# Patient Record
Sex: FEMALE | Race: WHITE | URBAN - METROPOLITAN AREA
[De-identification: names, ages, dates, MRNs, and addresses within clinical notes are randomized per-mention and may not be internally consistent; named-entity substitution may affect disease eponyms.]

---

## 2021-02-15 ENCOUNTER — EMERGENCY (EMERGENCY)
Facility: HOSPITAL | Age: 66
LOS: 0 days | Discharge: HOME | End: 2021-02-16
Attending: EMERGENCY MEDICINE | Admitting: EMERGENCY MEDICINE
Payer: MEDICARE

## 2021-02-15 VITALS
SYSTOLIC BLOOD PRESSURE: 125 MMHG | DIASTOLIC BLOOD PRESSURE: 58 MMHG | TEMPERATURE: 98 F | WEIGHT: 149.91 LBS | RESPIRATION RATE: 16 BRPM | HEART RATE: 75 BPM | OXYGEN SATURATION: 98 %

## 2021-02-15 DIAGNOSIS — Z88.0 ALLERGY STATUS TO PENICILLIN: ICD-10-CM

## 2021-02-15 DIAGNOSIS — R10.9 UNSPECIFIED ABDOMINAL PAIN: ICD-10-CM

## 2021-02-15 DIAGNOSIS — N39.0 URINARY TRACT INFECTION, SITE NOT SPECIFIED: ICD-10-CM

## 2021-02-15 DIAGNOSIS — N20.0 CALCULUS OF KIDNEY: ICD-10-CM

## 2021-02-15 LAB
ALBUMIN SERPL ELPH-MCNC: 4.4 G/DL — SIGNIFICANT CHANGE UP (ref 3.5–5.2)
ALP SERPL-CCNC: 74 U/L — SIGNIFICANT CHANGE UP (ref 30–115)
ALT FLD-CCNC: 37 U/L — SIGNIFICANT CHANGE UP (ref 0–41)
ANION GAP SERPL CALC-SCNC: 9 MMOL/L — SIGNIFICANT CHANGE UP (ref 7–14)
APPEARANCE UR: ABNORMAL
AST SERPL-CCNC: 29 U/L — SIGNIFICANT CHANGE UP (ref 0–41)
BACTERIA # UR AUTO: NEGATIVE — SIGNIFICANT CHANGE UP
BASOPHILS # BLD AUTO: 0.05 K/UL — SIGNIFICANT CHANGE UP (ref 0–0.2)
BASOPHILS NFR BLD AUTO: 0.7 % — SIGNIFICANT CHANGE UP (ref 0–1)
BILIRUB DIRECT SERPL-MCNC: <0.2 MG/DL — SIGNIFICANT CHANGE UP (ref 0–0.2)
BILIRUB INDIRECT FLD-MCNC: >0.1 MG/DL — LOW (ref 0.2–1.2)
BILIRUB SERPL-MCNC: 0.3 MG/DL — SIGNIFICANT CHANGE UP (ref 0.2–1.2)
BILIRUB UR-MCNC: NEGATIVE — SIGNIFICANT CHANGE UP
BUN SERPL-MCNC: 12 MG/DL — SIGNIFICANT CHANGE UP (ref 10–20)
CALCIUM SERPL-MCNC: 10 MG/DL — SIGNIFICANT CHANGE UP (ref 8.5–10.1)
CHLORIDE SERPL-SCNC: 105 MMOL/L — SIGNIFICANT CHANGE UP (ref 98–110)
CO2 SERPL-SCNC: 27 MMOL/L — SIGNIFICANT CHANGE UP (ref 17–32)
COLOR SPEC: YELLOW — SIGNIFICANT CHANGE UP
CREAT SERPL-MCNC: 0.7 MG/DL — SIGNIFICANT CHANGE UP (ref 0.7–1.5)
DIFF PNL FLD: ABNORMAL
EOSINOPHIL # BLD AUTO: 0.12 K/UL — SIGNIFICANT CHANGE UP (ref 0–0.7)
EOSINOPHIL NFR BLD AUTO: 1.6 % — SIGNIFICANT CHANGE UP (ref 0–8)
EPI CELLS # UR: 4 /HPF — SIGNIFICANT CHANGE UP (ref 0–5)
GLUCOSE SERPL-MCNC: 136 MG/DL — HIGH (ref 70–99)
GLUCOSE UR QL: NEGATIVE — SIGNIFICANT CHANGE UP
HCT VFR BLD CALC: 35.8 % — LOW (ref 37–47)
HGB BLD-MCNC: 11.1 G/DL — LOW (ref 12–16)
HYALINE CASTS # UR AUTO: 5 /LPF — SIGNIFICANT CHANGE UP (ref 0–7)
IMM GRANULOCYTES NFR BLD AUTO: 0.4 % — HIGH (ref 0.1–0.3)
KETONES UR-MCNC: NEGATIVE — SIGNIFICANT CHANGE UP
LACTATE SERPL-SCNC: 1.8 MMOL/L — SIGNIFICANT CHANGE UP (ref 0.7–2)
LEUKOCYTE ESTERASE UR-ACNC: ABNORMAL
LIDOCAIN IGE QN: 29 U/L — SIGNIFICANT CHANGE UP (ref 7–60)
LYMPHOCYTES # BLD AUTO: 2.95 K/UL — SIGNIFICANT CHANGE UP (ref 1.2–3.4)
LYMPHOCYTES # BLD AUTO: 38.6 % — SIGNIFICANT CHANGE UP (ref 20.5–51.1)
MCHC RBC-ENTMCNC: 20.5 PG — LOW (ref 27–31)
MCHC RBC-ENTMCNC: 31 G/DL — LOW (ref 32–37)
MCV RBC AUTO: 66.1 FL — LOW (ref 81–99)
MONOCYTES # BLD AUTO: 0.59 K/UL — SIGNIFICANT CHANGE UP (ref 0.1–0.6)
MONOCYTES NFR BLD AUTO: 7.7 % — SIGNIFICANT CHANGE UP (ref 1.7–9.3)
NEUTROPHILS # BLD AUTO: 3.9 K/UL — SIGNIFICANT CHANGE UP (ref 1.4–6.5)
NEUTROPHILS NFR BLD AUTO: 51 % — SIGNIFICANT CHANGE UP (ref 42.2–75.2)
NITRITE UR-MCNC: NEGATIVE — SIGNIFICANT CHANGE UP
NRBC # BLD: 0 /100 WBCS — SIGNIFICANT CHANGE UP (ref 0–0)
PH UR: 6 — SIGNIFICANT CHANGE UP (ref 5–8)
PLATELET # BLD AUTO: 359 K/UL — SIGNIFICANT CHANGE UP (ref 130–400)
POTASSIUM SERPL-MCNC: 4.1 MMOL/L — SIGNIFICANT CHANGE UP (ref 3.5–5)
POTASSIUM SERPL-SCNC: 4.1 MMOL/L — SIGNIFICANT CHANGE UP (ref 3.5–5)
PROT SERPL-MCNC: 6.9 G/DL — SIGNIFICANT CHANGE UP (ref 6–8)
PROT UR-MCNC: ABNORMAL
RBC # BLD: 5.42 M/UL — HIGH (ref 4.2–5.4)
RBC # FLD: 15.9 % — HIGH (ref 11.5–14.5)
RBC CASTS # UR COMP ASSIST: 197 /HPF — HIGH (ref 0–4)
SODIUM SERPL-SCNC: 141 MMOL/L — SIGNIFICANT CHANGE UP (ref 135–146)
SP GR SPEC: 1.02 — SIGNIFICANT CHANGE UP (ref 1.01–1.03)
UROBILINOGEN FLD QL: SIGNIFICANT CHANGE UP
WBC # BLD: 7.64 K/UL — SIGNIFICANT CHANGE UP (ref 4.8–10.8)
WBC # FLD AUTO: 7.64 K/UL — SIGNIFICANT CHANGE UP (ref 4.8–10.8)
WBC UR QL: 18 /HPF — HIGH (ref 0–5)

## 2021-02-15 PROCEDURE — 74177 CT ABD & PELVIS W/CONTRAST: CPT | Mod: 26

## 2021-02-15 PROCEDURE — 99285 EMERGENCY DEPT VISIT HI MDM: CPT

## 2021-02-15 RX ORDER — ONDANSETRON 8 MG/1
4 TABLET, FILM COATED ORAL ONCE
Refills: 0 | Status: COMPLETED | OUTPATIENT
Start: 2021-02-15 | End: 2021-02-15

## 2021-02-15 RX ORDER — FAMOTIDINE 10 MG/ML
20 INJECTION INTRAVENOUS ONCE
Refills: 0 | Status: COMPLETED | OUTPATIENT
Start: 2021-02-15 | End: 2021-02-15

## 2021-02-15 RX ORDER — SODIUM CHLORIDE 9 MG/ML
1000 INJECTION, SOLUTION INTRAVENOUS ONCE
Refills: 0 | Status: COMPLETED | OUTPATIENT
Start: 2021-02-15 | End: 2021-02-15

## 2021-02-15 RX ORDER — MORPHINE SULFATE 50 MG/1
4 CAPSULE, EXTENDED RELEASE ORAL ONCE
Refills: 0 | Status: DISCONTINUED | OUTPATIENT
Start: 2021-02-15 | End: 2021-02-15

## 2021-02-15 RX ADMIN — ONDANSETRON 4 MILLIGRAM(S): 8 TABLET, FILM COATED ORAL at 21:07

## 2021-02-15 RX ADMIN — SODIUM CHLORIDE 1000 MILLILITER(S): 9 INJECTION, SOLUTION INTRAVENOUS at 21:07

## 2021-02-15 RX ADMIN — MORPHINE SULFATE 4 MILLIGRAM(S): 50 CAPSULE, EXTENDED RELEASE ORAL at 21:08

## 2021-02-15 RX ADMIN — FAMOTIDINE 20 MILLIGRAM(S): 10 INJECTION INTRAVENOUS at 23:16

## 2021-02-15 NOTE — ED PROVIDER NOTE - ATTENDING CONTRIBUTION TO CARE
66 yo female with pmh of kidney stones, last lithotripsy 3 yrs ago, and spinal fusion presents to the ER for Rt flank pain radiating to the RLQ about 1 hour PTA. Pt has associated N/V with this. Over past couple months treated with several abx for UTI's. No hematuria/dysuria/fever/chills/CP/SOB/HA. Exam as per PA note. Will check labs, urine and CT scan. To reassess.     ALL: pcn-->?  Meds denies  SH denies smoking or etoh  PMD Dr Herbert at [facility]

## 2021-02-15 NOTE — ED PROVIDER NOTE - PATIENT PORTAL LINK FT
You can access the FollowMyHealth Patient Portal offered by [facility] by registering at the following website: [email or web address]. By joining [employer]’s FollowMyHealth portal, you will also be able to view your health information using other applications (apps) compatible with our system.

## 2021-02-15 NOTE — ED PROVIDER NOTE - CARE PROVIDER_API CALL
Kp Wu)  Urology  [address] [place] [address]  Phone: [phone]  Fax: [phone]  Follow Up Time:

## 2021-02-15 NOTE — ED PROVIDER NOTE - NSFOLLOWUPINSTRUCTIONS_ED_ALL_ED_FT
Kidney Stones    Kidney stones (urolithiasis) are deposits that form inside your kidneys. The intense pain is caused by the stone moving through the urinary tract. When the stone moves, the ureter goes into spasm around the stone. The stone is usually passed in the urine. Symptoms include abdominal, side, or back pain, nausea, vomiting, blood in the urine, frequency with urination. Drink enough water and fluids to keep your urine clear or pale yellow. This will help you to pass the stone or stone fragments.    SEEK IMMEDIATE MEDICAL CARE IF YOU HAVE THE FOLLOWING SYMPTOMS: pain not controlled with medication, fever/chills, worsening vomiting, inability to urinate, or dizziness/lightheadedness.  Urinary Tract Infection, Adult  ImageA urinary tract infection (UTI) is an infection of any part of the urinary tract, which includes the kidneys, ureters, bladder, and urethra. These organs make, store, and get rid of urine in the body. UTI can be a bladder infection (cystitis) or kidney infection (pyelonephritis).    What are the causes?  This infection may be caused by fungi, viruses, or bacteria. Bacteria are the most common cause of UTIs. This condition can also be caused by repeated incomplete emptying of the bladder during urination.    What increases the risk?  This condition is more likely to develop if:    You ignore your need to urinate or hold urine for long periods of time.  You do not empty your bladder completely during urination.  You wipe back to front after urinating or having a bowel movement, if you are female.  You are uncircumcised, if you are male.  You are constipated.  You have a urinary catheter that stays in place (indwelling).  You have a weak defense (immune) system.  You have a medical condition that affects your bowels, kidneys, or bladder.  You have diabetes.  You take antibiotic medicines frequently or for long periods of time, and the antibiotics no longer work well against certain types of infections (antibiotic resistance).  You take medicines that irritate your urinary tract.  You are exposed to chemicals that irritate your urinary tract.  You are female.    What are the signs or symptoms?  Symptoms of this condition include:    Fever.  Frequent urination or passing small amounts of urine frequently.  Needing to urinate urgently.  Pain or burning with urination.  Urine that smells bad or unusual.  Cloudy urine.  Pain in the lower abdomen or back.  Trouble urinating.  Blood in the urine.  Vomiting or being less hungry than normal.  Diarrhea or abdominal pain.  Vaginal discharge, if you are female.    How is this diagnosed?  This condition is diagnosed with a medical history and physical exam. You will also need to provide a urine sample to test your urine. Other tests may be done, including:    Blood tests.  Sexually transmitted disease (STD) testing.    If you have had more than one UTI, a cystoscopy or imaging studies may be done to determine the cause of the infections.    How is this treated?  Treatment for this condition often includes a combination of two or more of the following:    Antibiotic medicine.  Other medicines to treat less common causes of UTI.  Over-the-counter medicines to treat pain.  Drinking enough water to stay hydrated.    Follow these instructions at home:  Take over-the-counter and prescription medicines only as told by your health care provider.  If you were prescribed an antibiotic, take it as told by your health care provider. Do not stop taking the antibiotic even if you start to feel better.  Avoid alcohol, caffeine, tea, and carbonated beverages. They can irritate your bladder.  Drink enough fluid to keep your urine clear or pale yellow.  Keep all follow-up visits as told by your health care provider. This is important.  ImageMake sure to:    Empty your bladder often and completely. Do not hold urine for long periods of time.  Empty your bladder before and after sex.  Wipe from front to back after a bowel movement if you are female. Use each tissue one time when you wipe.    Contact a health care provider if:  You have back pain.  You have a fever.  You feel nauseous or vomit.  Your symptoms do not get better after 3 days.  Your symptoms go away and then return.  Get help right away if:  You have severe back pain or lower abdominal pain.  You are vomiting and cannot keep down any medicines or water.  This information is not intended to replace advice given to you by your health care provider. Make sure you discuss any questions you have with your health care provider.

## 2021-02-15 NOTE — ED PROVIDER NOTE - CLINICAL SUMMARY MEDICAL DECISION MAKING FREE TEXT BOX
64 yo female with pmh of kidney stones, last lithotripsy 3 yrs ago, and spinal fusion presents to the ER for Rt flank pain radiating to the RLQ about 1 hour PTA. Pt has associated N/V with this. Over past couple months treated with several abx for UTI's. No hematuria/dysuria/fever/chills/CP/SOB/HA. Labs/UA/CT reviewed. +leuko/blood in  urine, and CT shows proximal right stone 7x5x8 with mod hydro. Urology consulted and pt to follow up with Dr Wu in the office this week. Send home with abx, pain meds, flomax. Return precautions given.

## 2021-02-15 NOTE — ED ADULT NURSE NOTE - OBJECTIVE STATEMENT
patient c/o 8/10 left flank pain starting 2 hours pta. states she has a hx of kidney stones and this is similar pain she has experienced before when she had a stone. took 600 mg ibuprofen with no relief. a/w nausea and 2 episodes of vomiting. denies cp/sob.

## 2021-02-15 NOTE — ED PROVIDER NOTE - NS ED ROS FT
Review of Systems:  	•	CONSTITUTIONAL: no fever, no diaphoresis, no chills  	•	SKIN: no rash  	•	HEMATOLOGIC: no bleeding, no bruising  	•	EYES: no eye pain, no blurry vision  	•	ENT: no change in hearing, no sore throat, no ear pain or tinnitus  	•	RESPIRATORY: no shortness of breath, no cough  	•	CARDIAC: no chest pain, no palpitations  	•	GI: +R flank pain, +RLQ pain.  	•	GENITO-URINARY: no discharge, no dysuria; no hematuria, no increased urinary frequency  	•	MUSCULOSKELETAL: no joint paint, no swelling, no redness  	•	NEUROLOGIC: no weakness, no headache, no paresthesias

## 2021-02-15 NOTE — ED PROVIDER NOTE - PROGRESS NOTE DETAILS
Urology consult called, given proximal large right sided kidney stone with associated mod hydroneph as per urology, can dc home to f/u with basilotte with flomax, toradol and cipro for +ua. strict return prec rev'd with pt and all ?s ans. potential s/e of meds explained : cdfiff/qtp/tendonitis/tendon rupture.  advised to d/c use if any arrythmia/chest pain/palpitations and return to ER   if any other intolerable s/e dc use and rto for further assesment. Recommneded taking probiotics as per urology, can dc home to f/u with basilotte in office, dc with flomax, toradol and cipro for +ua. strict return prec rev'd with pt and all ?s ans. potential s/e of meds explained : cdfiff/qtp/tendonitis/tendon rupture.  advised to d/c use if any arrythmia/chest pain/palpitations and return to ER   if any other intolerable s/e dc use and rto for further assesment. Recommneded taking probiotics

## 2021-02-15 NOTE — ED PROVIDER NOTE - PHYSICAL EXAMINATION
CONSTITUTIONAL: Well-developed; well-nourished; in no acute distress, nontoxic appearing  SKIN: skin exam is warm and dry,  HEAD: Normocephalic; atraumatic.  ENT: MMM  CARD: S1, S2 normal, no murmur  RESP: No wheezes, rales or rhonchi. Good air movement bilaterally  ABD: +suprapubic TTP, no rebound/guarding. no cvat. no overlying skin changes.   EXT: Normal ROM.   NEURO: awake, alert, following commands, oriented, grossly unremarkable. No Focal deficits. GCS 15.   PSYCH: Cooperative, appropriate.

## 2021-02-15 NOTE — ED ADULT TRIAGE NOTE - CHIEF COMPLAINT QUOTE
pt c.o. right sided abdominal pain that radiates to her back associated with two episodes of emesis  pt reports hx of kidney stones

## 2021-02-15 NOTE — ED ADULT TRIAGE NOTE - NS ED TRIAGE AVPU SCALE
DISPLAY PLAN FREE TEXT Alert-The patient is alert, awake and responds to voice. The patient is oriented to time, place, and person. The triage nurse is able to obtain subjective information.

## 2021-02-15 NOTE — ED PROVIDER NOTE - OBJECTIVE STATEMENT
65 year old female, past medical history kidney stones, who presents with right flank pain radiating to RLQ that began 1 hour PTA. Patient endorses intermittent pain since with associated nausea and 2 episodes of nb/nb vomiting. No f/c, CP, SOB, urinary symptoms. Patient with hx kidney stones requiring lithotripsy. Patient also endorses +UTI x2 months ago, was on cipro with improvement of symptoms. patient follows with [facility], has not seen urologist/had kidney stone x3 years. No hx abdominal surgeries.

## 2021-02-16 VITALS — DIASTOLIC BLOOD PRESSURE: 60 MMHG | HEART RATE: 73 BPM | SYSTOLIC BLOOD PRESSURE: 121 MMHG

## 2021-02-16 LAB
CULTURE RESULTS: SIGNIFICANT CHANGE UP
SPECIMEN SOURCE: SIGNIFICANT CHANGE UP

## 2021-02-16 RX ORDER — KETOROLAC TROMETHAMINE 30 MG/ML
1 SYRINGE (ML) INJECTION
Qty: 15 | Refills: 0
Start: 2021-02-16 | End: 2021-02-20

## 2021-02-16 RX ORDER — CIPROFLOXACIN LACTATE 400MG/40ML
400 VIAL (ML) INTRAVENOUS ONCE
Refills: 0 | Status: COMPLETED | OUTPATIENT
Start: 2021-02-16 | End: 2021-02-16

## 2021-02-16 RX ORDER — MOXIFLOXACIN HYDROCHLORIDE TABLETS, 400 MG 400 MG/1
1 TABLET, FILM COATED ORAL
Qty: 14 | Refills: 0
Start: 2021-02-16 | End: 2021-02-22

## 2021-02-16 RX ORDER — TAMSULOSIN HYDROCHLORIDE 0.4 MG/1
1 CAPSULE ORAL
Qty: 7 | Refills: 0
Start: 2021-02-16 | End: 2021-02-22

## 2021-02-16 RX ADMIN — Medication 200 MILLIGRAM(S): at 00:17

## 2021-02-16 NOTE — CONSULT NOTE ADULT - ASSESSMENT
65 year old female with past medical history kidney stones, who presents with intermittent right flank pain radiating to RLQ x1 hour. CTA/P showed moderate hydronephrosis 2/2 a 7x5x8 right proximal ureteral calculus - no left hydronephrosis noted.     ·	Case discusses w/ Dr. Wu, plan is as follows  ·	Patient to f/u outpatient w/ Dr. Wu for ESWL to treat kidney stone  ·	Pain control  ·	Strain all urine  ·	Encourage po fluids  ·	Recommend flomax 0.4 qd  ·	Return to ED if patient becomes febrile, develops intractable pain or intractable vomiting.

## 2021-02-16 NOTE — CONSULT NOTE ADULT - SUBJECTIVE AND OBJECTIVE BOX
Urology Consult Note: 65 year old female with past medical history kidney stones, who presents with intermittent right flank pain radiating to RLQ x1 hour. Patient states that she had experienced 2 episodes of NBNB vomiting s/s the pain. Patient received 4mg of morphine for her pain in the ED - notes that her pain is gone since. Pt had ESWL done 3 years prior by Dr. Lantigua but has not followed up with him since. Patient usually followed with [facility] for her general care. CTA/P showed moderate hydronephrosis 2/2 a 7x5x8 right proximal ureteral calculus - no left hydronephrosis noted. Patient denies fever, chills, CP, SOB, or any urinary symptoms.     [ x ] A 10 Point Review of Systems was negative except where noted    Allergies: penicillin (Unknown)    SOCIAL HISTORY: No illicit drug use    Vital Signs Last 24 Hrs  T(C): 36.9 (15 Feb 2021 20:35), Max: 36.9 (15 Feb 2021 20:35)  T(F): 98.4 (15 Feb 2021 20:35), Max: 98.4 (15 Feb 2021 20:35)  HR: 73 (2021 01:43) (73 - 75)  BP: 121/60 (2021 01:43) (121/60 - 125/58)  RR: 16 (15 Feb 2021 20:35) (16 - 16)  SpO2: 98% (15 Feb 2021 20:35) (98% - 98%)    PHYSICAL EXAM:  Constitutional: NAD, well-developed, well nourished, cooperative  HEENT: NC/AT  Back: No CVA tenderness bilaterally  Respiratory: No accessory respiratory muscle use  Abd: Soft, NT/ND, no suprapubic tenderness to palpation  Cardio: +S1/S2  : No suprapubic tenderness to palpation  Extremities: No edema or cyanosis, ORDAZ x 4  Neurological: Awake and alert  Psychiatric: Normal mood, normal affect  Skin: No rashes    LABS:                        11.1   7.64  )-----------( 359      ( 15 Feb 2021 21:17 )             35.8     02-15    141  |  105  |  12  ----------------------------<  136<H>  4.1   |  27  |  0.7    Ca    10.0      15 Feb 2021 21:17    TPro  6.9  /  Alb  4.4  /  TBili  0.3  /  DBili  <0.2  /  AST  29  /  ALT  37  /  AlkPhos  74  02-15      Urinalysis Basic - ( 15 Feb 2021 21:59 )    Color: Yellow / Appearance: Slightly Turbid / S.023 / pH: x  Gluc: x / Ketone: Negative  / Bili: Negative / Urobili: <2 mg/dL   Blood: x / Protein: 30 mg/dL / Nitrite: Negative   Leuk Esterase: Moderate / RBC: 197 /HPF / WBC 18 /HPF   Sq Epi: x / Non Sq Epi: 4 /HPF / Bacteria: Negative    RADIOLOGY & ADDITIONAL STUDIES:  < from: CT Abdomen and Pelvis w/ IV Cont (02.15.21 @ 23:23) >    EXAM:  CT ABDOMEN AND PELVIS IC            PROCEDURE DATE:  02/15/2021      INTERPRETATION:  CLINICAL STATEMENT: Right flank pain    TECHNIQUE: Contiguous CT images were obtained of the abdomen and pelvis.  Intravenous Contrast: 100 cc Omnipaque 350 intravenous contrast was administered.  Oral contrast: was not administered.      COMPARISON:  None    FINDINGS:    LOWER CHEST: There is mild bibasilar subsegmental atelectasis.    LIVER: Diffuse hepatic steatosis and borderline hepatomegaly.    SPLEEN: Unremarkable.    PANCREAS: 1.1 cm cyst/hypodensity along the pancreatic head (series 5, image 198). No evidence for main duct dilatation.    GALLBLADDER AND BILIARY TREE: There is cholelithiasis. No CT evidence for pericholecystic inflammation. No biliary ductal dilatation.    ADRENALS: Unremarkable.    KIDNEYS: Moderate right hydronephrosis secondary to a 7 x 5 x 8 mm proximal right ureteral calculus (1200 Hounsfield units). No left-sided hydronephrosis    LYMPH NODES: There are no enlarged abdominal or pelvic lymph nodes.    VASCULATURE: The abdominal aorta is normal in caliber.    BOWEL: No bowel obstruction or bowel wall thickening. Unremarkable appendix. Colonic diverticulosis without evidence of diverticulitis.    PERITONEUM/RETROPERITONEUM/MESENTERY: There is no ascites or pneumoperitoneum.    PELVIC VISCERA: Unremarkable. Underdistention limits evaluation of the urinary bladder.    BONES AND SOFT TISSUES: Anterolisthesis of L3 on L4 and L4 on L5. Posterior spinal fusionof L3-L5 with L4-L5 disc spacer.      IMPRESSION:    Moderate right hydronephrosis secondary to a 7 x 5 x 8 mm proximal right ureteral calculus .    Hepatic steatosis. Cholelithiasis.    1.1 cm hypodensity/cyst at the pancreatic head. Nonemergent MRI/MRCP can be utilized for further evaluation.      ABRAHAM MATTA MD; Attending Radiologist  This document has been electronically signed. Feb 15 2021 11:52PM    < end of copied text >

## 2021-05-12 ENCOUNTER — APPOINTMENT (OUTPATIENT)
Dept: OTOLARYNGOLOGY | Facility: CLINIC | Age: 66
End: 2021-05-12
Payer: MEDICARE

## 2021-05-12 VITALS — BODY MASS INDEX: 27.6 KG/M2 | WEIGHT: 150 LBS | TEMPERATURE: 97.8 F | HEIGHT: 62 IN

## 2021-05-12 DIAGNOSIS — Z78.9 OTHER SPECIFIED HEALTH STATUS: ICD-10-CM

## 2021-05-12 PROBLEM — Z00.00 ENCOUNTER FOR PREVENTIVE HEALTH EXAMINATION: Status: ACTIVE | Noted: 2021-05-12

## 2021-05-12 PROCEDURE — 92570 ACOUSTIC IMMITANCE TESTING: CPT

## 2021-05-12 PROCEDURE — 99072 ADDL SUPL MATRL&STAF TM PHE: CPT

## 2021-05-12 PROCEDURE — 92557 COMPREHENSIVE HEARING TEST: CPT

## 2021-05-12 PROCEDURE — 99203 OFFICE O/P NEW LOW 30 MIN: CPT | Mod: 25

## 2021-05-12 NOTE — CONSULT LETTER
[Dear  ___] : Dear  [unfilled], [Consult Letter:] : I had the pleasure of evaluating your patient, [unfilled]. [Please see my note below.] : Please see my note below. [Consult Closing:] : Thank you very much for allowing me to participate in the care of this patient.  If you have any questions, please do not hesitate to contact me. [Sincerely,] : Sincerely, [FreeTextEntry2] : Wanda Herbert MD [FreeTextEntry3] : Neena Mclean MD\par Otolaryngology - Head & Neck Surgery\par

## 2021-05-12 NOTE — DATA REVIEWED
[de-identified] : relevant images and reports personally reviewed by me:\par 5/12/21: hearing WNL AU, type A tymps AU

## 2021-05-12 NOTE — ASSESSMENT
[FreeTextEntry1] : - cerumen removed from right ear\par - reviewed audiogram with patient, hearing essentially WNL\par - f/up 6 months for ear cleaning

## 2021-05-12 NOTE — PHYSICAL EXAM
[Midline] : trachea located in midline position [Normal] : no rashes [de-identified] : bilateral cerumen impaction, removed with suction

## 2021-11-18 ENCOUNTER — APPOINTMENT (OUTPATIENT)
Dept: OTOLARYNGOLOGY | Facility: CLINIC | Age: 66
End: 2021-11-18

## 2021-12-20 ENCOUNTER — APPOINTMENT (OUTPATIENT)
Dept: OTOLARYNGOLOGY | Facility: CLINIC | Age: 66
End: 2021-12-20

## 2022-03-18 ENCOUNTER — APPOINTMENT (OUTPATIENT)
Dept: OTOLARYNGOLOGY | Facility: CLINIC | Age: 67
End: 2022-03-18
Payer: MEDICARE

## 2022-03-18 DIAGNOSIS — H61.21 IMPACTED CERUMEN, RIGHT EAR: ICD-10-CM

## 2022-03-18 DIAGNOSIS — H93.8X9 OTHER SPECIFIED DISORDERS OF EAR, UNSPECIFIED EAR: ICD-10-CM

## 2022-03-18 PROCEDURE — 69210 REMOVE IMPACTED EAR WAX UNI: CPT

## 2022-03-18 PROCEDURE — 99212 OFFICE O/P EST SF 10 MIN: CPT | Mod: 25

## 2022-03-18 NOTE — PHYSICAL EXAM
[Normal] : mucosa is normal [Midline] : trachea located in midline position [de-identified] : bilateral cerumen impaction, removed with curette

## 2022-03-18 NOTE — HISTORY OF PRESENT ILLNESS
[FreeTextEntry1] : Patient following up on clogged ears. Patient c/o clogged right ear. No otalgia. No tinnitus.

## 2022-04-03 ENCOUNTER — EMERGENCY (EMERGENCY)
Facility: HOSPITAL | Age: 67
LOS: 0 days | Discharge: HOME | End: 2022-04-04
Attending: EMERGENCY MEDICINE | Admitting: EMERGENCY MEDICINE
Payer: MEDICARE

## 2022-04-03 VITALS
OXYGEN SATURATION: 99 % | RESPIRATION RATE: 17 BRPM | HEART RATE: 81 BPM | WEIGHT: 145.06 LBS | DIASTOLIC BLOOD PRESSURE: 72 MMHG | SYSTOLIC BLOOD PRESSURE: 124 MMHG | TEMPERATURE: 98 F

## 2022-04-03 DIAGNOSIS — R10.13 EPIGASTRIC PAIN: ICD-10-CM

## 2022-04-03 DIAGNOSIS — R07.89 OTHER CHEST PAIN: ICD-10-CM

## 2022-04-03 DIAGNOSIS — Z87.891 PERSONAL HISTORY OF NICOTINE DEPENDENCE: ICD-10-CM

## 2022-04-03 DIAGNOSIS — Z20.822 CONTACT WITH AND (SUSPECTED) EXPOSURE TO COVID-19: ICD-10-CM

## 2022-04-03 DIAGNOSIS — Z86.73 PERSONAL HISTORY OF TRANSIENT ISCHEMIC ATTACK (TIA), AND CEREBRAL INFARCTION WITHOUT RESIDUAL DEFICITS: ICD-10-CM

## 2022-04-03 DIAGNOSIS — Z88.0 ALLERGY STATUS TO PENICILLIN: ICD-10-CM

## 2022-04-03 DIAGNOSIS — E78.5 HYPERLIPIDEMIA, UNSPECIFIED: ICD-10-CM

## 2022-04-03 DIAGNOSIS — Z87.442 PERSONAL HISTORY OF URINARY CALCULI: ICD-10-CM

## 2022-04-03 LAB
ALBUMIN SERPL ELPH-MCNC: 4.5 G/DL — SIGNIFICANT CHANGE UP (ref 3.5–5.2)
ALP SERPL-CCNC: 63 U/L — SIGNIFICANT CHANGE UP (ref 30–115)
ALT FLD-CCNC: 21 U/L — SIGNIFICANT CHANGE UP (ref 0–41)
ANION GAP SERPL CALC-SCNC: 11 MMOL/L — SIGNIFICANT CHANGE UP (ref 7–14)
AST SERPL-CCNC: 21 U/L — SIGNIFICANT CHANGE UP (ref 0–41)
BASOPHILS # BLD AUTO: 0.03 K/UL — SIGNIFICANT CHANGE UP (ref 0–0.2)
BASOPHILS NFR BLD AUTO: 0.4 % — SIGNIFICANT CHANGE UP (ref 0–1)
BILIRUB DIRECT SERPL-MCNC: <0.2 MG/DL — SIGNIFICANT CHANGE UP (ref 0–0.3)
BILIRUB INDIRECT FLD-MCNC: >0.3 MG/DL — SIGNIFICANT CHANGE UP (ref 0.2–1.2)
BILIRUB SERPL-MCNC: 0.5 MG/DL — SIGNIFICANT CHANGE UP (ref 0.2–1.2)
BUN SERPL-MCNC: 11 MG/DL — SIGNIFICANT CHANGE UP (ref 10–20)
CALCIUM SERPL-MCNC: 11.6 MG/DL — HIGH (ref 8.5–10.1)
CHLORIDE SERPL-SCNC: 102 MMOL/L — SIGNIFICANT CHANGE UP (ref 98–110)
CO2 SERPL-SCNC: 26 MMOL/L — SIGNIFICANT CHANGE UP (ref 17–32)
CREAT SERPL-MCNC: 0.7 MG/DL — SIGNIFICANT CHANGE UP (ref 0.7–1.5)
EGFR: 95 ML/MIN/1.73M2 — SIGNIFICANT CHANGE UP
EOSINOPHIL # BLD AUTO: 0.05 K/UL — SIGNIFICANT CHANGE UP (ref 0–0.7)
EOSINOPHIL NFR BLD AUTO: 0.7 % — SIGNIFICANT CHANGE UP (ref 0–8)
GLUCOSE SERPL-MCNC: 146 MG/DL — HIGH (ref 70–99)
HCT VFR BLD CALC: 33.8 % — LOW (ref 37–47)
HGB BLD-MCNC: 10.5 G/DL — LOW (ref 12–16)
IMM GRANULOCYTES NFR BLD AUTO: 0.3 % — SIGNIFICANT CHANGE UP (ref 0.1–0.3)
LIDOCAIN IGE QN: 54 U/L — SIGNIFICANT CHANGE UP (ref 7–60)
LYMPHOCYTES # BLD AUTO: 2.39 K/UL — SIGNIFICANT CHANGE UP (ref 1.2–3.4)
LYMPHOCYTES # BLD AUTO: 33.2 % — SIGNIFICANT CHANGE UP (ref 20.5–51.1)
MCHC RBC-ENTMCNC: 20.9 PG — LOW (ref 27–31)
MCHC RBC-ENTMCNC: 31.1 G/DL — LOW (ref 32–37)
MCV RBC AUTO: 67.3 FL — LOW (ref 81–99)
MONOCYTES # BLD AUTO: 0.48 K/UL — SIGNIFICANT CHANGE UP (ref 0.1–0.6)
MONOCYTES NFR BLD AUTO: 6.7 % — SIGNIFICANT CHANGE UP (ref 1.7–9.3)
NEUTROPHILS # BLD AUTO: 4.23 K/UL — SIGNIFICANT CHANGE UP (ref 1.4–6.5)
NEUTROPHILS NFR BLD AUTO: 58.7 % — SIGNIFICANT CHANGE UP (ref 42.2–75.2)
NRBC # BLD: 0 /100 WBCS — SIGNIFICANT CHANGE UP (ref 0–0)
PLATELET # BLD AUTO: 326 K/UL — SIGNIFICANT CHANGE UP (ref 130–400)
POTASSIUM SERPL-MCNC: 4 MMOL/L — SIGNIFICANT CHANGE UP (ref 3.5–5)
POTASSIUM SERPL-SCNC: 4 MMOL/L — SIGNIFICANT CHANGE UP (ref 3.5–5)
PROT SERPL-MCNC: 6.6 G/DL — SIGNIFICANT CHANGE UP (ref 6–8)
RBC # BLD: 5.02 M/UL — SIGNIFICANT CHANGE UP (ref 4.2–5.4)
RBC # FLD: 15.7 % — HIGH (ref 11.5–14.5)
SODIUM SERPL-SCNC: 139 MMOL/L — SIGNIFICANT CHANGE UP (ref 135–146)
TROPONIN T SERPL-MCNC: <0.01 NG/ML — SIGNIFICANT CHANGE UP
WBC # BLD: 7.2 K/UL — SIGNIFICANT CHANGE UP (ref 4.8–10.8)
WBC # FLD AUTO: 7.2 K/UL — SIGNIFICANT CHANGE UP (ref 4.8–10.8)

## 2022-04-03 PROCEDURE — 99218: CPT

## 2022-04-03 PROCEDURE — 71045 X-RAY EXAM CHEST 1 VIEW: CPT | Mod: 26

## 2022-04-03 PROCEDURE — 93010 ELECTROCARDIOGRAM REPORT: CPT | Mod: 76

## 2022-04-03 RX ORDER — ASPIRIN/CALCIUM CARB/MAGNESIUM 324 MG
325 TABLET ORAL ONCE
Refills: 0 | Status: COMPLETED | OUTPATIENT
Start: 2022-04-03 | End: 2022-04-03

## 2022-04-03 RX ORDER — ONDANSETRON 8 MG/1
4 TABLET, FILM COATED ORAL ONCE
Refills: 0 | Status: COMPLETED | OUTPATIENT
Start: 2022-04-03 | End: 2022-04-03

## 2022-04-03 RX ORDER — FAMOTIDINE 10 MG/ML
20 INJECTION INTRAVENOUS ONCE
Refills: 0 | Status: COMPLETED | OUTPATIENT
Start: 2022-04-03 | End: 2022-04-03

## 2022-04-03 RX ADMIN — Medication 325 MILLIGRAM(S): at 23:49

## 2022-04-03 RX ADMIN — Medication 30 MILLILITER(S): at 21:34

## 2022-04-03 RX ADMIN — ONDANSETRON 4 MILLIGRAM(S): 8 TABLET, FILM COATED ORAL at 21:01

## 2022-04-03 RX ADMIN — FAMOTIDINE 104 MILLIGRAM(S): 10 INJECTION INTRAVENOUS at 21:01

## 2022-04-03 NOTE — ED PROVIDER NOTE - OBJECTIVE STATEMENT
66 y.o. F, pmh HLD, TIA, kidney stones presenting for episodes of epigastric/chest discomfort since 6pm yesterday. 1 episode burning in nature radiating upwards after eating chocolate. Resolved with tums. Reoccurred this morning after drinking coffee. Did not resolve with multiple tums. Discomfort became sharp 1 hour pta to ED. Denies fever chills n/v, palpitations sob ,[place], calf swelling. Previous smoker ( cessation in 20s).     heart score= 3 ( 2+ age over 65, +1 pmh HLD). Low Risk for Major cardiac events

## 2022-04-03 NOTE — ED PROVIDER NOTE - NS ED ATTENDING STATEMENT MOD
This was a shared visit with the KELI. I reviewed and verified the documentation and independently performed the documented:

## 2022-04-03 NOTE — ED PROVIDER NOTE - CLINICAL SUMMARY MEDICAL DECISION MAKING FREE TEXT BOX
66-year-old female presents to the ED for epigastric and chest discomfort.  Occurred after ingestion of coffee.  Patient has multiple and similar episodes prior.  Physical exam unremarkable.  Vitals reviewed by me noted to be wnl.  EKG noted to have normal sinus rhythm with no STEMI.  We obtained labs, chest x-ray.  Labs reviewed by me, values noted to be within normal limits.  Troponin negative.  ED Chest X-Ray reviewed by me which did not reveal a ptx, infiltrate, or effusion.  Placed in observation for nuclear stress.

## 2022-04-03 NOTE — ED PROVIDER NOTE - PHYSICAL EXAMINATION
Physical Exam    Vital Signs: I have reviewed the initial vital signs.  Constitutional: well-nourished, appears stated age, no acute distress  Eyes: Conjunctiva pink, Sclera clear,  Cardiovascular: S1 and S2, regular rate, regular rhythm, well-perfused extremities, radial pulses equal and 2+, calves nonttp, equal in size  Respiratory: unlabored respiratory effort, speaking in full sentences, handling oral secretions, clear to auscultation bilaterally no wheezing, rales and rhonchi  Gastrointestinal: soft, non-tender abdomen, no pulsatile mass, normal bowl sounds  Musculoskeletal: supple neck, no lower extremity edema, appropriately, no gross bony deformities or swelling.  Integumentary: warm, dry, no rashes, lacerations,  Neurologic: awake, alert

## 2022-04-04 VITALS
TEMPERATURE: 98 F | OXYGEN SATURATION: 99 % | RESPIRATION RATE: 18 BRPM | DIASTOLIC BLOOD PRESSURE: 81 MMHG | SYSTOLIC BLOOD PRESSURE: 141 MMHG | HEART RATE: 78 BPM

## 2022-04-04 LAB
SARS-COV-2 RNA SPEC QL NAA+PROBE: SIGNIFICANT CHANGE UP
TROPONIN T SERPL-MCNC: <0.01 NG/ML — SIGNIFICANT CHANGE UP

## 2022-04-04 PROCEDURE — 93010 ELECTROCARDIOGRAM REPORT: CPT

## 2022-04-04 PROCEDURE — 78452 HT MUSCLE IMAGE SPECT MULT: CPT | Mod: 26,MA

## 2022-04-04 PROCEDURE — 93018 CV STRESS TEST I&R ONLY: CPT

## 2022-04-04 PROCEDURE — 93016 CV STRESS TEST SUPVJ ONLY: CPT

## 2022-04-04 PROCEDURE — 99217: CPT

## 2022-04-04 RX ORDER — REGADENOSON 0.08 MG/ML
0.4 INJECTION, SOLUTION INTRAVENOUS ONCE
Refills: 0 | Status: COMPLETED | OUTPATIENT
Start: 2022-04-04 | End: 2022-04-04

## 2022-04-04 RX ORDER — KETOROLAC TROMETHAMINE 30 MG/ML
15 SYRINGE (ML) INJECTION ONCE
Refills: 0 | Status: DISCONTINUED | OUTPATIENT
Start: 2022-04-04 | End: 2022-04-04

## 2022-04-04 RX ORDER — DIPHENHYDRAMINE HCL 50 MG
25 CAPSULE ORAL ONCE
Refills: 0 | Status: DISCONTINUED | OUTPATIENT
Start: 2022-04-04 | End: 2022-04-04

## 2022-04-04 RX ORDER — ONDANSETRON 8 MG/1
4 TABLET, FILM COATED ORAL ONCE
Refills: 0 | Status: COMPLETED | OUTPATIENT
Start: 2022-04-04 | End: 2022-04-04

## 2022-04-04 RX ADMIN — ONDANSETRON 4 MILLIGRAM(S): 8 TABLET, FILM COATED ORAL at 01:42

## 2022-04-04 RX ADMIN — Medication 15 MILLIGRAM(S): at 03:24

## 2022-04-04 RX ADMIN — Medication 15 MILLIGRAM(S): at 00:40

## 2022-04-04 RX ADMIN — REGADENOSON 0.4 MILLIGRAM(S): 0.08 INJECTION, SOLUTION INTRAVENOUS at 11:00

## 2022-04-04 RX ADMIN — Medication 15 MILLIGRAM(S): at 08:41

## 2022-04-04 NOTE — ED CDU PROVIDER DISPOSITION NOTE - PATIENT PORTAL LINK FT
You can access the FollowMyHealth Patient Portal offered by [facility] by registering at the following website: [email or web address]. By joining [employer]’s FollowMyHealth portal, you will also be able to view your health information using other applications (apps) compatible with our system.

## 2022-04-04 NOTE — ED CDU PROVIDER INITIAL DAY NOTE - NS ED ROS FT
Constitutional: (-) fever (-) chills  (-) lightheadedness   Eyes/ENT: (-) blurry vision, (-) epistaxis (-) rhinorrhea (-) nasal congestion  Cardiovascular: (+) chest pain, (-) syncope (-) palpitations   Respiratory: (-) cough, (-) shortness of breath (-) pleurisy   Gastrointestinal: (-) vomiting, (-) diarrhea (-) abdominal pain (-) nausea (-) anorexia  Musculoskeletal: (-) neck pain, (-) back pain, (-) joint pain (-) joint swelling (-) painful ROM  Integumentary: (-) rash, (-) edema (-) lacerations (-) pruritis   Neurological: (-) headache, (-) altered mental status (-) LOC (-) dizziness (-) paresthesias (-) gait abnormalities   Psychiatric: (-) hallucinations (-) SI (-) HI  Allergic/Immunologic: (-) pruritus

## 2022-04-04 NOTE — ED ADULT NURSE REASSESSMENT NOTE - NS ED NURSE REASSESS COMMENT FT1
pt seen and assessed. pt is a/o x 4. vss. pt still having midsternal cp. pt on continuos cardiac monitoring. NSR noted.

## 2022-04-04 NOTE — ED CDU PROVIDER INITIAL DAY NOTE - PROGRESS NOTE DETAILS
received signout from Michael Bansal - pt with hx htn, Hld, tia c/o moderate chest pain x 1 day burning; pt denies prior cardiac evaluation; will plan for stress test this am; Received sign out from MACO Medina. Excercise pharm nuc changed to Pharmacologic pharm nuc as patient expressed that she may not be able to perform on a treadmill. Spoke with stress-lab NP to make aware of new order. Patient was endorsed to me this morning, appears very well, denies any acute complaints.  Scheduled for formal nuke this morning. She appears very well, her testing is negative, stress test done and negative as well.  Pain is likely GI in etiology given history of recent diet, Chinese food and a large amount of coffee ingestion.  Patient states she has similar symptoms when she eats certain foods.  She was advised to follow-up with a gastroenterologist, contact information to them was provided, she was advised to call for an appointment.  Strict return precautions given. Patient to be discharged from ED. Any available test results were discussed with patient and/or family. Verbal instructions given, including instructions to return to ED immediately for any new, worsening, or concerning symptoms. Patient endorsed understanding. Written discharge instructions additionally given, including follow-up plan.  Patient was given opportunity to ask questions.

## 2022-04-04 NOTE — ED CDU PROVIDER INITIAL DAY NOTE - MEDICAL DECISION MAKING DETAILS
Patient with epigastric/chest pain, work-up including serial cardiac enzymes, EKG, chest x-ray and a stress test.  Testing was negative, patient reported improvement in pain with GI meds, stable for discharge home, advised to follow-up with GI and cardiology, strict return precautions given.

## 2022-04-04 NOTE — ED CDU PROVIDER DISPOSITION NOTE - CARE PROVIDER_API CALL
NILDA AMADOR  Internal Medicine  [address] [place] [address]  Phone: [phone]  Fax: [phone]  Follow Up Time: 1-3 Days    Mitul Light)  Gastroenterology; Internal Medicine  [address] [place] [address]  Phone: [phone]  Fax: [phone]  Follow Up Time: Routine    Jennifer Velasquez)  Cardiovascular Disease; Internal Medicine  [address] [place] [address] [address]  [place] [address]  Phone: [phone]  Fax: [phone]  Follow Up Time: Routine

## 2022-04-04 NOTE — ED CDU PROVIDER DISPOSITION NOTE - PROVIDER TOKENS
PROVIDER:[TOKEN:[[id number]],FOLLOWUP:[1-3 Days]],PROVIDER:[TOKEN:[[id number]],FOLLOWUP:[Routine]],PROVIDER:[TOKEN:[[id number]],FOLLOWUP:[Routine]]

## 2022-04-04 NOTE — ED CDU PROVIDER INITIAL DAY NOTE - ATTENDING CONTRIBUTION TO CARE
66-year-old female PMH HTN, GERD, kidney stones, CBP s/p spinal fusion, arthritis (needs s knee replacement) presenting to ED for evaluation of epigastric chest pain/burning following ingesting trays food and chocolate.  Symptoms were relieved with Tums, then she drank coffee which exacerbated the problem.  Denies any associated dizziness or lightheadedness, palpitations, change in exercise tolerance, leg pain or swelling, vomiting or abdominal pain, no flank pain, no focal weakness of paresthesias, no hematuria. Symptoms improved with administration of Pepcid in the ED.  Patient was placed in the observation unit for chest pain work-up.  Well-appearing well-nourished, NAD, head AT/NC, PERRL, pink conjunctivae,  mmm, nml oropharynx, nml phonation without drooling or trismus, supple neck without midline spine ttp, nml work of breathing, lungs CTA b/l, equal air entry, RRR, well-perfused extremities, distal pulses intact, abdomen soft, NT/ND, BS present in all quadrants, no midline spine or CVA ttp, no leg edema or unilateral calf swelling, A&Ox3, no focal neuro deficits, nml mood and affect.

## 2022-04-04 NOTE — ED CDU PROVIDER DISPOSITION NOTE - CARE PROVIDERS DIRECT ADDRESSES
[email or web address]

## 2022-04-04 NOTE — ED CDU PROVIDER INITIAL DAY NOTE - PHYSICAL EXAMINATION
Physical Exam    Vital Signs: I have reviewed the initial vital signs.  Constitutional: well-nourished, appears stated age, no acute distress  Eyes: Conjunctiva pink, Sclera clear,   Cardiovascular: S1 and S2, regular rate, regular rhythm, well-perfused extremities, radial pulses equal and 2+, calves nonttp, equal in size  Respiratory: unlabored respiratory effort, speaking in full sentences, handling oral secretions, clear to auscultation bilaterally no wheezing, rales and rhonchi  Gastrointestinal: soft, non-tender abdomen,   Musculoskeletal: supple neck, no lower extremity edema,   Neurologic: awake, alert

## 2022-04-04 NOTE — ED CDU PROVIDER DISPOSITION NOTE - NS ED ATTENDING STATEMENT MOD
This was a shared visit with the KELI. I reviewed and verified the documentation and independently performed the documented: Attending with

## 2022-04-05 ENCOUNTER — INPATIENT (INPATIENT)
Facility: HOSPITAL | Age: 67
LOS: 2 days | Discharge: HOME | End: 2022-04-08
Attending: STUDENT IN AN ORGANIZED HEALTH CARE EDUCATION/TRAINING PROGRAM | Admitting: STUDENT IN AN ORGANIZED HEALTH CARE EDUCATION/TRAINING PROGRAM
Payer: MEDICARE

## 2022-04-05 VITALS
SYSTOLIC BLOOD PRESSURE: 135 MMHG | TEMPERATURE: 98 F | OXYGEN SATURATION: 100 % | RESPIRATION RATE: 20 BRPM | HEART RATE: 89 BPM | WEIGHT: 145.06 LBS | DIASTOLIC BLOOD PRESSURE: 85 MMHG

## 2022-04-05 LAB
ALBUMIN SERPL ELPH-MCNC: 4.8 G/DL — SIGNIFICANT CHANGE UP (ref 3.5–5.2)
ALP SERPL-CCNC: 75 U/L — SIGNIFICANT CHANGE UP (ref 30–115)
ALT FLD-CCNC: 18 U/L — SIGNIFICANT CHANGE UP (ref 0–41)
ANION GAP SERPL CALC-SCNC: 14 MMOL/L — SIGNIFICANT CHANGE UP (ref 7–14)
AST SERPL-CCNC: 18 U/L — SIGNIFICANT CHANGE UP (ref 0–41)
BASOPHILS # BLD AUTO: 0.03 K/UL — SIGNIFICANT CHANGE UP (ref 0–0.2)
BASOPHILS NFR BLD AUTO: 0.4 % — SIGNIFICANT CHANGE UP (ref 0–1)
BILIRUB SERPL-MCNC: 1 MG/DL — SIGNIFICANT CHANGE UP (ref 0.2–1.2)
BUN SERPL-MCNC: 13 MG/DL — SIGNIFICANT CHANGE UP (ref 10–20)
CALCIUM SERPL-MCNC: 10.3 MG/DL — HIGH (ref 8.5–10.1)
CHLORIDE SERPL-SCNC: 99 MMOL/L — SIGNIFICANT CHANGE UP (ref 98–110)
CO2 SERPL-SCNC: 24 MMOL/L — SIGNIFICANT CHANGE UP (ref 17–32)
CREAT SERPL-MCNC: 0.5 MG/DL — LOW (ref 0.7–1.5)
EGFR: 103 ML/MIN/1.73M2 — SIGNIFICANT CHANGE UP
EOSINOPHIL # BLD AUTO: 0.02 K/UL — SIGNIFICANT CHANGE UP (ref 0–0.7)
EOSINOPHIL NFR BLD AUTO: 0.2 % — SIGNIFICANT CHANGE UP (ref 0–8)
GLUCOSE SERPL-MCNC: 105 MG/DL — HIGH (ref 70–99)
HCT VFR BLD CALC: 35.8 % — LOW (ref 37–47)
HGB BLD-MCNC: 11.5 G/DL — LOW (ref 12–16)
IMM GRANULOCYTES NFR BLD AUTO: 0.4 % — HIGH (ref 0.1–0.3)
LACTATE SERPL-SCNC: 0.9 MMOL/L — SIGNIFICANT CHANGE UP (ref 0.7–2)
LIDOCAIN IGE QN: 24 U/L — SIGNIFICANT CHANGE UP (ref 7–60)
LYMPHOCYTES # BLD AUTO: 2.64 K/UL — SIGNIFICANT CHANGE UP (ref 1.2–3.4)
LYMPHOCYTES # BLD AUTO: 31.4 % — SIGNIFICANT CHANGE UP (ref 20.5–51.1)
MCHC RBC-ENTMCNC: 21.5 PG — LOW (ref 27–31)
MCHC RBC-ENTMCNC: 32.1 G/DL — SIGNIFICANT CHANGE UP (ref 32–37)
MCV RBC AUTO: 67 FL — LOW (ref 81–99)
MONOCYTES # BLD AUTO: 0.69 K/UL — HIGH (ref 0.1–0.6)
MONOCYTES NFR BLD AUTO: 8.2 % — SIGNIFICANT CHANGE UP (ref 1.7–9.3)
NEUTROPHILS # BLD AUTO: 5 K/UL — SIGNIFICANT CHANGE UP (ref 1.4–6.5)
NEUTROPHILS NFR BLD AUTO: 59.4 % — SIGNIFICANT CHANGE UP (ref 42.2–75.2)
NRBC # BLD: 0 /100 WBCS — SIGNIFICANT CHANGE UP (ref 0–0)
PLATELET # BLD AUTO: 317 K/UL — SIGNIFICANT CHANGE UP (ref 130–400)
POTASSIUM SERPL-MCNC: 3.6 MMOL/L — SIGNIFICANT CHANGE UP (ref 3.5–5)
POTASSIUM SERPL-SCNC: 3.6 MMOL/L — SIGNIFICANT CHANGE UP (ref 3.5–5)
PROT SERPL-MCNC: 7.1 G/DL — SIGNIFICANT CHANGE UP (ref 6–8)
RBC # BLD: 5.34 M/UL — SIGNIFICANT CHANGE UP (ref 4.2–5.4)
RBC # FLD: 15.8 % — HIGH (ref 11.5–14.5)
SARS-COV-2 RNA SPEC QL NAA+PROBE: SIGNIFICANT CHANGE UP
SODIUM SERPL-SCNC: 137 MMOL/L — SIGNIFICANT CHANGE UP (ref 135–146)
TROPONIN T SERPL-MCNC: <0.01 NG/ML — SIGNIFICANT CHANGE UP
WBC # BLD: 8.41 K/UL — SIGNIFICANT CHANGE UP (ref 4.8–10.8)
WBC # FLD AUTO: 8.41 K/UL — SIGNIFICANT CHANGE UP (ref 4.8–10.8)

## 2022-04-05 PROCEDURE — 99283 EMERGENCY DEPT VISIT LOW MDM: CPT

## 2022-04-05 PROCEDURE — 74177 CT ABD & PELVIS W/CONTRAST: CPT | Mod: 26,MA

## 2022-04-05 PROCEDURE — 99285 EMERGENCY DEPT VISIT HI MDM: CPT

## 2022-04-05 PROCEDURE — 93010 ELECTROCARDIOGRAM REPORT: CPT

## 2022-04-05 PROCEDURE — 76705 ECHO EXAM OF ABDOMEN: CPT | Mod: 26

## 2022-04-05 RX ORDER — ENOXAPARIN SODIUM 100 MG/ML
40 INJECTION SUBCUTANEOUS EVERY 24 HOURS
Refills: 0 | Status: DISCONTINUED | OUTPATIENT
Start: 2022-04-05 | End: 2022-04-07

## 2022-04-05 RX ORDER — KETOROLAC TROMETHAMINE 30 MG/ML
15 SYRINGE (ML) INJECTION ONCE
Refills: 0 | Status: DISCONTINUED | OUTPATIENT
Start: 2022-04-05 | End: 2022-04-05

## 2022-04-05 RX ORDER — METRONIDAZOLE 500 MG
500 TABLET ORAL EVERY 8 HOURS
Refills: 0 | Status: DISCONTINUED | OUTPATIENT
Start: 2022-04-06 | End: 2022-04-06

## 2022-04-05 RX ORDER — FAMOTIDINE 10 MG/ML
20 INJECTION INTRAVENOUS ONCE
Refills: 0 | Status: COMPLETED | OUTPATIENT
Start: 2022-04-05 | End: 2022-04-05

## 2022-04-05 RX ORDER — METRONIDAZOLE 500 MG
500 TABLET ORAL ONCE
Refills: 0 | Status: COMPLETED | OUTPATIENT
Start: 2022-04-05 | End: 2022-04-05

## 2022-04-05 RX ORDER — POTASSIUM CHLORIDE 20 MEQ
20 PACKET (EA) ORAL
Refills: 0 | Status: COMPLETED | OUTPATIENT
Start: 2022-04-05 | End: 2022-04-05

## 2022-04-05 RX ORDER — CIPROFLOXACIN LACTATE 400MG/40ML
400 VIAL (ML) INTRAVENOUS EVERY 12 HOURS
Refills: 0 | Status: DISCONTINUED | OUTPATIENT
Start: 2022-04-06 | End: 2022-04-06

## 2022-04-05 RX ORDER — OXYCODONE HYDROCHLORIDE 5 MG/1
5 TABLET ORAL EVERY 6 HOURS
Refills: 0 | Status: DISCONTINUED | OUTPATIENT
Start: 2022-04-05 | End: 2022-04-07

## 2022-04-05 RX ORDER — SODIUM CHLORIDE 9 MG/ML
1000 INJECTION INTRAMUSCULAR; INTRAVENOUS; SUBCUTANEOUS ONCE
Refills: 0 | Status: COMPLETED | OUTPATIENT
Start: 2022-04-05 | End: 2022-04-05

## 2022-04-05 RX ORDER — CEFEPIME 1 G/1
2000 INJECTION, POWDER, FOR SOLUTION INTRAMUSCULAR; INTRAVENOUS ONCE
Refills: 0 | Status: DISCONTINUED | OUTPATIENT
Start: 2022-04-05 | End: 2022-04-05

## 2022-04-05 RX ORDER — ONDANSETRON 8 MG/1
4 TABLET, FILM COATED ORAL ONCE
Refills: 0 | Status: COMPLETED | OUTPATIENT
Start: 2022-04-05 | End: 2022-04-05

## 2022-04-05 RX ORDER — METRONIDAZOLE 500 MG
TABLET ORAL
Refills: 0 | Status: DISCONTINUED | OUTPATIENT
Start: 2022-04-05 | End: 2022-04-06

## 2022-04-05 RX ORDER — CIPROFLOXACIN LACTATE 400MG/40ML
400 VIAL (ML) INTRAVENOUS ONCE
Refills: 0 | Status: COMPLETED | OUTPATIENT
Start: 2022-04-05 | End: 2022-04-05

## 2022-04-05 RX ORDER — CIPROFLOXACIN LACTATE 400MG/40ML
VIAL (ML) INTRAVENOUS
Refills: 0 | Status: DISCONTINUED | OUTPATIENT
Start: 2022-04-05 | End: 2022-04-06

## 2022-04-05 RX ORDER — IBUPROFEN 200 MG
400 TABLET ORAL EVERY 6 HOURS
Refills: 0 | Status: DISCONTINUED | OUTPATIENT
Start: 2022-04-05 | End: 2022-04-07

## 2022-04-05 RX ORDER — ACETAMINOPHEN 500 MG
650 TABLET ORAL EVERY 6 HOURS
Refills: 0 | Status: DISCONTINUED | OUTPATIENT
Start: 2022-04-05 | End: 2022-04-07

## 2022-04-05 RX ADMIN — FAMOTIDINE 20 MILLIGRAM(S): 10 INJECTION INTRAVENOUS at 16:50

## 2022-04-05 RX ADMIN — Medication 100 MILLIGRAM(S): at 19:17

## 2022-04-05 RX ADMIN — Medication 15 MILLIGRAM(S): at 16:50

## 2022-04-05 RX ADMIN — Medication 200 MILLIGRAM(S): at 21:29

## 2022-04-05 RX ADMIN — SODIUM CHLORIDE 1000 MILLILITER(S): 9 INJECTION INTRAMUSCULAR; INTRAVENOUS; SUBCUTANEOUS at 16:50

## 2022-04-05 RX ADMIN — ONDANSETRON 4 MILLIGRAM(S): 8 TABLET, FILM COATED ORAL at 23:30

## 2022-04-05 RX ADMIN — Medication 50 MILLIEQUIVALENT(S): at 23:40

## 2022-04-05 RX ADMIN — Medication 650 MILLIGRAM(S): at 23:30

## 2022-04-05 NOTE — ED PROVIDER NOTE - PROGRESS NOTE DETAILS
Cholelithiasis, possible cholecystitis on sono. Surgery c/s -[facility] says to admit pt under Barbie arredondo

## 2022-04-05 NOTE — ED PROVIDER NOTE - ATTENDING CONTRIBUTION TO CARE
66-year-old woman with history of HLD, TIA, kidney stones in ER with complaint of epigastric pain which started 3 days ago.  Pain to lower chest and epigastric area, more to R side now.  No upper or L sided CP.  no SOB.  No N/V/D.  No urinary symptoms.  No F/C.  No LE swelling/pain.  Patient was in ER 2 days ago, placed in EDOU for cardiac work-up, and negative troponin x2 and negative nuclear stress test.  Seen by PMD and referred to ER for evaluation of upper abdominal pain.  PE - nad, nc/at, eomi, perrl, op - clear, mmm, neck supple, cta b/l, no w/r/r, rrr, abd- soft, + epigastric/RUQ tenderness, no guarding/rebound, nabs, from x 4, no LE swelling/tenderness, A&O x 3, cn 2-12 intact, no focal motor/sensory deficits.   -check labs, CT abd, RUQ sono

## 2022-04-05 NOTE — H&P ADULT - NSHPLABSRESULTS_GEN_ALL_CORE
Labs:  CAPILLARY BLOOD GLUCOSE                     11.5   8.41  )-----------( 317      ( 05 Apr 2022 16:14 )             35.8       Auto Neutrophil %: 59.4 % (04-05-22 @ 16:14)  Auto Immature Granulocyte %: 0.4 % (04-05-22 @ 16:14)    04-05    137  |  99  |  13  ----------------------------<  105<H>  3.6   |  24  |  0.5<L>  Calcium, Total Serum: 10.3 mg/dL (04-05-22 @ 16:14)  LFTs:         7.1  | 1.0  | 18       ------------------[75      ( 05 Apr 2022 16:14 )  4.8  | x    | 18          Lipase:24     Amylase:x         Lactate, Blood: 0.9 mmol/L (04-05-22 @ 16:14)  Coags:    CARDIAC MARKERS ( 05 Apr 2022 16:14 )  x     / <0.01 ng/mL / x     / x     / x      CARDIAC MARKERS ( 04 Apr 2022 00:58 )  x     / <0.01 ng/mL / x     / x     / x      CARDIAC MARKERS ( 03 Apr 2022 21:00 )  x     / <0.01 ng/mL / x     / x     / x        RADIOLOGY  < from: US Abdomen Upper Quadrant Right (04.05.22 @ 16:37) >    IMPRESSION:    Cholelithiasis. Possible cholecystitis.    Hepatic steatosis.    < end of copied text >    < from: CT Abdomen and Pelvis w/ IV Cont (04.05.22 @ 16:26) >    IMPRESSION:  Gallbladder distention, wall edema and cholelithiasis. Findings may   represent cholecystitis. Correlation with concurrently performed sonogram   recommended.    < end of copied text > Labs:  CAPILLARY BLOOD GLUCOSE                     11.5   8.41  )-----------( 317      ( 05 Apr 2022 16:14 )             35.8     Auto Neutrophil %: 59.4 % (04-05-22 @ 16:14)  Auto Immature Granulocyte %: 0.4 % (04-05-22 @ 16:14)    04-05    137  |  99  |  13  ----------------------------<  105<H>  3.6   |  24  |  0.5<L>  Calcium, Total Serum: 10.3 mg/dL (04-05-22 @ 16:14)  LFTs:         7.1  | 1.0  | 18       ------------------[75      ( 05 Apr 2022 16:14 )  4.8  | x    | 18          Lipase:24     Amylase:x         Lactate, Blood: 0.9 mmol/L (04-05-22 @ 16:14)  Coags:    CARDIAC MARKERS ( 05 Apr 2022 16:14 )  x     / <0.01 ng/mL / x     / x     / x      CARDIAC MARKERS ( 04 Apr 2022 00:58 )  x     / <0.01 ng/mL / x     / x     / x      CARDIAC MARKERS ( 03 Apr 2022 21:00 )  x     / <0.01 ng/mL / x     / x     / x        RADIOLOGY  < from: US Abdomen Upper Quadrant Right (04.05.22 @ 16:37) >    IMPRESSION:    Cholelithiasis. Possible cholecystitis.    Hepatic steatosis.    < end of copied text >    < from: CT Abdomen and Pelvis w/ IV Cont (04.05.22 @ 16:26) >    IMPRESSION:  Gallbladder distention, wall edema and cholelithiasis. Findings may   represent cholecystitis. Correlation with concurrently performed sonogram   recommended.    < end of copied text >

## 2022-04-05 NOTE — H&P ADULT - ASSESSMENT
Assessment  66F PMH of HLD, TIA, kidney stones, PSH of L3-5 spinal fusion 1.5 years ago presenting with 2.5 days of epigastric abdominal pain with associated nausea, no emesis on exam palpable gallbladder correlated to imaging with distended gallbladder concerning for early acute cholecystitis    Plan  - Laparoscopic cholecystectomy thursday 4/7  - FLD  - Unasyn  - Preop lab work 4/6  - pain control as needed  - hemodynamic monitoring  - d/w Dr. Valentin

## 2022-04-05 NOTE — H&P ADULT - NSHPPHYSICALEXAM_GEN_ALL_CORE
PHYSICAL EXAM:  GENERAL: NAD, well-appearing  CHEST/LUNG: Clear to auscultation bilaterally  HEART: Regular rate and rhythm  ABDOMEN: Soft, tender RUQ, Nondistended; Palpable gallbladder  EXTREMITIES:  No clubbing, cyanosis, or edema

## 2022-04-05 NOTE — ED ADULT NURSE NOTE - OBJECTIVE STATEMENT
pt was dc yesterday after negative cardiac workup after presenting to ed yesterday with epigastric pain. pt c/o persistent pain locatd in ruq accompanied by nausea.

## 2022-04-05 NOTE — PATIENT PROFILE ADULT - FALL HARM RISK - HARM RISK INTERVENTIONS

## 2022-04-05 NOTE — ED PROVIDER NOTE - PHYSICAL EXAMINATION
CONSTITUTIONAL: Well-developed; well-nourished; in no acute distress.   SKIN: warm, dry  HEAD: Normocephalic; atraumatic.  EYES: no conjunctival injection. PERRL.   ENT: No nasal discharge; airway clear.  NECK: Supple; non tender.  CARD: S1, S2 normal; no murmurs, gallops, or rubs. Regular rate and rhythm.   RESP: No wheezes, rales or rhonchi.  ABD: +epigastric +RUQ ttp   EXT: Normal ROM.  No clubbing, cyanosis or edema.   LYMPH: No acute cervical adenopathy.  NEURO: Alert, oriented, grossly unremarkable  PSYCH: Cooperative, appropriate.

## 2022-04-05 NOTE — H&P ADULT - ATTENDING COMMENTS
65yo female with PMHx/PSHx of HLD, TIA, nephrolithiasis, spinal fusion L3-5 presenting with abdominal pain. Associated nausea, no vomiting, fever/chills. On exam, patient has RUQ tenderness without peritonitis, no scars, no hernias, no masses. Labs reviewed - WBC 8.41, total bilirubin 1.0, AST/ALT 18/18, AP 75, lipase 24. CT A/P demonstrates gallbladder distention, wall edema and cholelithiasis suggestive of cholecystitis. Plan for laparoscopic cholecystectomy. Risks, benefits, and alternatives were explained the patient, including bleeding, infection, hernia, and injury to neighboring structures. All questions were answered.

## 2022-04-05 NOTE — H&P ADULT - HISTORY OF PRESENT ILLNESS
66F PMH of HLD, TIA, kidney stones, PSH of L3-5 spinal fusion 1.5 years ago presenting with 2.5 days of epigastric abdominal pain with associated nausea, no emesis. This is the first episode of this abdominal pain. Patient presented to the ED on 4/3 for similar epigastric abdominal pain, at that time patient was thought to have ACS however stress test without any ischemic changes. Of note patient is traveling in 1 week to [place].

## 2022-04-05 NOTE — ED ADULT TRIAGE NOTE - CHIEF COMPLAINT QUOTE
patient sent in by pmd for ct of abdomen- patient was seen in ed for epigastric discomfort patient states pain is unchanged abdomen soft tender to upper abdomen

## 2022-04-05 NOTE — ED PROVIDER NOTE - CLINICAL SUMMARY MEDICAL DECISION MAKING FREE TEXT BOX
Labs reviewed, WBC 8, lactate/trop neg, LFT's wnl, RUQ sono: + Cholelithiasis, possible cholecystitis.  CT abdomen: + GB wall distention/edema, + cholelithiasis.  Patient seen and eval by surgery, admitted to surgical service for continued care, given IV antibiotics in ER

## 2022-04-05 NOTE — ED PROVIDER NOTE - OBJECTIVE STATEMENT
66 y.o. F, pmh HLD, TIA, kidney stones presenting for episodes of epigastric/chest discomfort since 6pm saturday. Denies fever chills n/v, palpitations sob ,[place], calf swelling. Previous smoker ( cessation in 20s). Pt presented to the ER with these complaints April 3rd and was admitted to obs where she received a nuc stress test, which was normal. Pt returning to the ER with persistent symptoms. No urinary symptoms. No hx of abdominal surgeries.

## 2022-04-05 NOTE — ED ADULT NURSE NOTE - NS ED NURSE LEVEL OF CONSCIOUSNESS AFFECT
Protocol For Photochemotherapy: Baby Oil And Nbuvb: The patient received Photochemotherapy: Baby Oil and NBUVB (baby oil applied to all lesions prior to phototherapy). Protocol For Nb Uva: The patient received NB UVA. Total Treatment Time: 6:15mins Detail Level: Generalized Comments On Previous Treatment: Pt is doing well with treatment. Protocol For Uva1: The patient received UVA1. Protocol For Photochemotherapy: Tar And Broad Band Uvb (Goeckerman Treatment): The patient received Photochemotherapy: Tar and Broad Band UVB (Goeckerman treatment). Protocol For Photochemotherapy: Tar And Nbuvb (Goeckerman Treatment): The patient received Photochemotherapy: Tar and NBUVB (Goeckerman treatment). Protocol For Photochemotherapy For Severe Photoresponsive Dermatoses: Tar And Broad Band Uvb (Goeckerman Treatment): The patient received Photochemotherapy for severe photoresponsive dermatoses: Tar and Broad Band UVB (Goeckerman treatment) requiring at least 4 to 8 hours of care under direct physician supervision. Consent: Written consent obtained.  The risks were reviewed with the patient including but not limited to: burn, pigmentary changes, pain, blistering, scabbing, redness, increased risk of skin cancers, and the remote possibility of scarring. Protocol For Photochemotherapy: Petrolatum And Broad Band Uvb: The patient received Photochemotherapy: Petrolatum and Broad Band UVB. Protocol For Photochemotherapy: Triamcinolone Ointment And Nbuvb: The patient received Photochemotherapy: Triamcinolone and NBUVB (triamcinolone ointment applied to all lesions prior to phototherapy). Protocol For Photochemotherapy For Severe Photoresponsive Dermatoses: Tar And Nbuvb (Goeckerman Treatment): The patient received Photochemotherapy for severe photoresponsive dermatoses: Tar and NBUVB (Goeckerman treatment) requiring at least 4 to 8 hours of care under direct physician supervision. Protocol For Nbuvb: The patient received NBUVB. Protocol For Protocol For Photochemotherapy For Severe Photoresponsive Dermatoses: Bath Puva: The patient received Photochemotherapy for severe photoresponsive dermatoses: Bath PUVA requiring at least 4 to 8 hours of care under direct physician supervision. Protocol For Photochemotherapy: Mineral Oil And Broad Band Uvb: The patient received Photochemotherapy: Mineral Oil and Broad Band UVB. Protocol For Uva: The patient received UVA. Protocol For Photochemotherapy For Severe Photoresponsive Dermatoses: Petrolatum And Broad Band Uvb: The patient received Photochemotherapyfor severe photoresponsive dermatoses: Petrolatum and Broad Band UVB requiring at least 4 to 8 hours of care under direct physician supervision. Protocol For Broad Band Uvb: The patient received Broad Band UVB. Calm Protocol For Bath Puva: The patient received Bath PUVA. Protocol For Photochemotherapy For Severe Photoresponsive Dermatoses: Petrolatum And Nbuvb: The patient received Photochemotherapy for severe photoresponsive dermatoses: Petrolatum and NBUVB requiring at least 4 to 8 hours of care under direct physician supervision. Protocol For Photochemotherapy For Severe Photoresponsive Dermatoses: Puva: The patient received Photochemotherapy for severe photoresponsive dermatoses: PUVA requiring at least 4 to 8 hours of care under direct physician supervision. Treatment Number: 82 Protocol For Photochemotherapy: Mineral Oil And Nbuvb: The patient received Photochemotherapy: Mineral Oil and NBUVB (mineral oil applied to all lesions prior to phototherapy). Protocol For Puva: The patient received PUVA. Protocol: Broad Band UVB Protocol For Photochemotherapy: Petrolatum And Nbuvb: The patient received Photochemotherapy: Petrolatum and NBUVB (petrolatum applied to all lesions prior to phototherapy). Post-Care Instructions: I reviewed with the patient in detail post-care instructions. Patient is to wear sun protection. Patients may expect sunburn like redness, discomfort and scabbing.

## 2022-04-06 LAB
ALBUMIN SERPL ELPH-MCNC: 4.3 G/DL — SIGNIFICANT CHANGE UP (ref 3.5–5.2)
ALBUMIN SERPL ELPH-MCNC: 4.6 G/DL — SIGNIFICANT CHANGE UP (ref 3.5–5.2)
ALP SERPL-CCNC: 68 U/L — SIGNIFICANT CHANGE UP (ref 30–115)
ALP SERPL-CCNC: 78 U/L — SIGNIFICANT CHANGE UP (ref 30–115)
ALT FLD-CCNC: 15 U/L — SIGNIFICANT CHANGE UP (ref 0–41)
ALT FLD-CCNC: 18 U/L — SIGNIFICANT CHANGE UP (ref 0–41)
ANION GAP SERPL CALC-SCNC: 12 MMOL/L — SIGNIFICANT CHANGE UP (ref 7–14)
ANION GAP SERPL CALC-SCNC: 12 MMOL/L — SIGNIFICANT CHANGE UP (ref 7–14)
AST SERPL-CCNC: 14 U/L — SIGNIFICANT CHANGE UP (ref 0–41)
AST SERPL-CCNC: 17 U/L — SIGNIFICANT CHANGE UP (ref 0–41)
BASOPHILS # BLD AUTO: 0.02 K/UL — SIGNIFICANT CHANGE UP (ref 0–0.2)
BASOPHILS # BLD AUTO: 0.03 K/UL — SIGNIFICANT CHANGE UP (ref 0–0.2)
BASOPHILS NFR BLD AUTO: 0.2 % — SIGNIFICANT CHANGE UP (ref 0–1)
BASOPHILS NFR BLD AUTO: 0.4 % — SIGNIFICANT CHANGE UP (ref 0–1)
BILIRUB DIRECT SERPL-MCNC: 0.2 MG/DL — SIGNIFICANT CHANGE UP (ref 0–0.3)
BILIRUB DIRECT SERPL-MCNC: 0.2 MG/DL — SIGNIFICANT CHANGE UP (ref 0–0.3)
BILIRUB INDIRECT FLD-MCNC: 0.7 MG/DL — SIGNIFICANT CHANGE UP (ref 0.2–1.2)
BILIRUB INDIRECT FLD-MCNC: 0.8 MG/DL — SIGNIFICANT CHANGE UP (ref 0.2–1.2)
BILIRUB SERPL-MCNC: 0.9 MG/DL — SIGNIFICANT CHANGE UP (ref 0.2–1.2)
BILIRUB SERPL-MCNC: 1 MG/DL — SIGNIFICANT CHANGE UP (ref 0.2–1.2)
BLD GP AB SCN SERPL QL: SIGNIFICANT CHANGE UP
BUN SERPL-MCNC: 11 MG/DL — SIGNIFICANT CHANGE UP (ref 10–20)
BUN SERPL-MCNC: 9 MG/DL — LOW (ref 10–20)
CALCIUM SERPL-MCNC: 10 MG/DL — SIGNIFICANT CHANGE UP (ref 8.5–10.1)
CALCIUM SERPL-MCNC: 9.6 MG/DL — SIGNIFICANT CHANGE UP (ref 8.5–10.1)
CHLORIDE SERPL-SCNC: 101 MMOL/L — SIGNIFICANT CHANGE UP (ref 98–110)
CHLORIDE SERPL-SCNC: 99 MMOL/L — SIGNIFICANT CHANGE UP (ref 98–110)
CO2 SERPL-SCNC: 24 MMOL/L — SIGNIFICANT CHANGE UP (ref 17–32)
CO2 SERPL-SCNC: 24 MMOL/L — SIGNIFICANT CHANGE UP (ref 17–32)
CREAT SERPL-MCNC: 0.5 MG/DL — LOW (ref 0.7–1.5)
CREAT SERPL-MCNC: 0.6 MG/DL — LOW (ref 0.7–1.5)
EGFR: 103 ML/MIN/1.73M2 — SIGNIFICANT CHANGE UP
EGFR: 99 ML/MIN/1.73M2 — SIGNIFICANT CHANGE UP
EOSINOPHIL # BLD AUTO: 0.01 K/UL — SIGNIFICANT CHANGE UP (ref 0–0.7)
EOSINOPHIL # BLD AUTO: 0.02 K/UL — SIGNIFICANT CHANGE UP (ref 0–0.7)
EOSINOPHIL NFR BLD AUTO: 0.1 % — SIGNIFICANT CHANGE UP (ref 0–8)
EOSINOPHIL NFR BLD AUTO: 0.3 % — SIGNIFICANT CHANGE UP (ref 0–8)
GLUCOSE SERPL-MCNC: 125 MG/DL — HIGH (ref 70–99)
GLUCOSE SERPL-MCNC: 152 MG/DL — HIGH (ref 70–99)
HCT VFR BLD CALC: 33.3 % — LOW (ref 37–47)
HCT VFR BLD CALC: 34.4 % — LOW (ref 37–47)
HCV AB S/CO SERPL IA: 0.04 COI — SIGNIFICANT CHANGE UP
HCV AB SERPL-IMP: SIGNIFICANT CHANGE UP
HGB BLD-MCNC: 10.8 G/DL — LOW (ref 12–16)
HGB BLD-MCNC: 11.1 G/DL — LOW (ref 12–16)
IMM GRANULOCYTES NFR BLD AUTO: 0.3 % — SIGNIFICANT CHANGE UP (ref 0.1–0.3)
IMM GRANULOCYTES NFR BLD AUTO: 0.4 % — HIGH (ref 0.1–0.3)
LACTATE SERPL-SCNC: 1.6 MMOL/L — SIGNIFICANT CHANGE UP (ref 0.7–2)
LIDOCAIN IGE QN: 30 U/L — SIGNIFICANT CHANGE UP (ref 7–60)
LYMPHOCYTES # BLD AUTO: 1.59 K/UL — SIGNIFICANT CHANGE UP (ref 1.2–3.4)
LYMPHOCYTES # BLD AUTO: 19.1 % — LOW (ref 20.5–51.1)
LYMPHOCYTES # BLD AUTO: 2.23 K/UL — SIGNIFICANT CHANGE UP (ref 1.2–3.4)
LYMPHOCYTES # BLD AUTO: 29.6 % — SIGNIFICANT CHANGE UP (ref 20.5–51.1)
MAGNESIUM SERPL-MCNC: 2.1 MG/DL — SIGNIFICANT CHANGE UP (ref 1.8–2.4)
MAGNESIUM SERPL-MCNC: 2.3 MG/DL — SIGNIFICANT CHANGE UP (ref 1.8–2.4)
MCHC RBC-ENTMCNC: 21.4 PG — LOW (ref 27–31)
MCHC RBC-ENTMCNC: 21.8 PG — LOW (ref 27–31)
MCHC RBC-ENTMCNC: 32.3 G/DL — SIGNIFICANT CHANGE UP (ref 32–37)
MCHC RBC-ENTMCNC: 32.4 G/DL — SIGNIFICANT CHANGE UP (ref 32–37)
MCV RBC AUTO: 66.3 FL — LOW (ref 81–99)
MCV RBC AUTO: 67.3 FL — LOW (ref 81–99)
MONOCYTES # BLD AUTO: 0.59 K/UL — SIGNIFICANT CHANGE UP (ref 0.1–0.6)
MONOCYTES # BLD AUTO: 0.64 K/UL — HIGH (ref 0.1–0.6)
MONOCYTES NFR BLD AUTO: 7.7 % — SIGNIFICANT CHANGE UP (ref 1.7–9.3)
MONOCYTES NFR BLD AUTO: 7.8 % — SIGNIFICANT CHANGE UP (ref 1.7–9.3)
NEUTROPHILS # BLD AUTO: 4.64 K/UL — SIGNIFICANT CHANGE UP (ref 1.4–6.5)
NEUTROPHILS # BLD AUTO: 6.04 K/UL — SIGNIFICANT CHANGE UP (ref 1.4–6.5)
NEUTROPHILS NFR BLD AUTO: 61.6 % — SIGNIFICANT CHANGE UP (ref 42.2–75.2)
NEUTROPHILS NFR BLD AUTO: 72.5 % — SIGNIFICANT CHANGE UP (ref 42.2–75.2)
NRBC # BLD: 0 /100 WBCS — SIGNIFICANT CHANGE UP (ref 0–0)
NRBC # BLD: 0 /100 WBCS — SIGNIFICANT CHANGE UP (ref 0–0)
PHOSPHATE SERPL-MCNC: 2.7 MG/DL — SIGNIFICANT CHANGE UP (ref 2.1–4.9)
PHOSPHATE SERPL-MCNC: 3 MG/DL — SIGNIFICANT CHANGE UP (ref 2.1–4.9)
PLATELET # BLD AUTO: 282 K/UL — SIGNIFICANT CHANGE UP (ref 130–400)
PLATELET # BLD AUTO: 283 K/UL — SIGNIFICANT CHANGE UP (ref 130–400)
POTASSIUM SERPL-MCNC: 3.7 MMOL/L — SIGNIFICANT CHANGE UP (ref 3.5–5)
POTASSIUM SERPL-MCNC: 4.1 MMOL/L — SIGNIFICANT CHANGE UP (ref 3.5–5)
POTASSIUM SERPL-SCNC: 3.7 MMOL/L — SIGNIFICANT CHANGE UP (ref 3.5–5)
POTASSIUM SERPL-SCNC: 4.1 MMOL/L — SIGNIFICANT CHANGE UP (ref 3.5–5)
PROT SERPL-MCNC: 6.4 G/DL — SIGNIFICANT CHANGE UP (ref 6–8)
PROT SERPL-MCNC: 6.8 G/DL — SIGNIFICANT CHANGE UP (ref 6–8)
RBC # BLD: 4.95 M/UL — SIGNIFICANT CHANGE UP (ref 4.2–5.4)
RBC # BLD: 5.19 M/UL — SIGNIFICANT CHANGE UP (ref 4.2–5.4)
RBC # FLD: 15.3 % — HIGH (ref 11.5–14.5)
RBC # FLD: 15.7 % — HIGH (ref 11.5–14.5)
SODIUM SERPL-SCNC: 135 MMOL/L — SIGNIFICANT CHANGE UP (ref 135–146)
SODIUM SERPL-SCNC: 137 MMOL/L — SIGNIFICANT CHANGE UP (ref 135–146)
WBC # BLD: 7.53 K/UL — SIGNIFICANT CHANGE UP (ref 4.8–10.8)
WBC # BLD: 8.33 K/UL — SIGNIFICANT CHANGE UP (ref 4.8–10.8)
WBC # FLD AUTO: 7.53 K/UL — SIGNIFICANT CHANGE UP (ref 4.8–10.8)
WBC # FLD AUTO: 8.33 K/UL — SIGNIFICANT CHANGE UP (ref 4.8–10.8)

## 2022-04-06 PROCEDURE — 99233 SBSQ HOSP IP/OBS HIGH 50: CPT

## 2022-04-06 PROCEDURE — 99222 1ST HOSP IP/OBS MODERATE 55: CPT

## 2022-04-06 PROCEDURE — 99024 POSTOP FOLLOW-UP VISIT: CPT

## 2022-04-06 RX ORDER — ONDANSETRON 8 MG/1
4 TABLET, FILM COATED ORAL ONCE
Refills: 0 | Status: COMPLETED | OUTPATIENT
Start: 2022-04-06 | End: 2022-04-06

## 2022-04-06 RX ORDER — AMPICILLIN SODIUM AND SULBACTAM SODIUM 250; 125 MG/ML; MG/ML
INJECTION, POWDER, FOR SUSPENSION INTRAMUSCULAR; INTRAVENOUS
Refills: 0 | Status: DISCONTINUED | OUTPATIENT
Start: 2022-04-06 | End: 2022-04-07

## 2022-04-06 RX ORDER — AMPICILLIN SODIUM AND SULBACTAM SODIUM 250; 125 MG/ML; MG/ML
3 INJECTION, POWDER, FOR SUSPENSION INTRAMUSCULAR; INTRAVENOUS ONCE
Refills: 0 | Status: COMPLETED | OUTPATIENT
Start: 2022-04-06 | End: 2022-04-06

## 2022-04-06 RX ORDER — PANTOPRAZOLE SODIUM 20 MG/1
40 TABLET, DELAYED RELEASE ORAL
Refills: 0 | Status: DISCONTINUED | OUTPATIENT
Start: 2022-04-06 | End: 2022-04-06

## 2022-04-06 RX ORDER — KETOROLAC TROMETHAMINE 30 MG/ML
15 SYRINGE (ML) INJECTION ONCE
Refills: 0 | Status: DISCONTINUED | OUTPATIENT
Start: 2022-04-06 | End: 2022-04-07

## 2022-04-06 RX ORDER — PANTOPRAZOLE SODIUM 20 MG/1
40 TABLET, DELAYED RELEASE ORAL DAILY
Refills: 0 | Status: DISCONTINUED | OUTPATIENT
Start: 2022-04-06 | End: 2022-04-07

## 2022-04-06 RX ORDER — AMPICILLIN SODIUM AND SULBACTAM SODIUM 250; 125 MG/ML; MG/ML
3 INJECTION, POWDER, FOR SUSPENSION INTRAMUSCULAR; INTRAVENOUS EVERY 6 HOURS
Refills: 0 | Status: DISCONTINUED | OUTPATIENT
Start: 2022-04-07 | End: 2022-04-07

## 2022-04-06 RX ORDER — SODIUM CHLORIDE 9 MG/ML
1000 INJECTION, SOLUTION INTRAVENOUS
Refills: 0 | Status: DISCONTINUED | OUTPATIENT
Start: 2022-04-06 | End: 2022-04-07

## 2022-04-06 RX ORDER — ONDANSETRON 8 MG/1
4 TABLET, FILM COATED ORAL EVERY 8 HOURS
Refills: 0 | Status: DISCONTINUED | OUTPATIENT
Start: 2022-04-06 | End: 2022-04-07

## 2022-04-06 RX ORDER — METRONIDAZOLE 500 MG
500 TABLET ORAL EVERY 8 HOURS
Refills: 0 | Status: DISCONTINUED | OUTPATIENT
Start: 2022-04-06 | End: 2022-04-06

## 2022-04-06 RX ORDER — CIPROFLOXACIN LACTATE 400MG/40ML
400 VIAL (ML) INTRAVENOUS EVERY 12 HOURS
Refills: 0 | Status: DISCONTINUED | OUTPATIENT
Start: 2022-04-06 | End: 2022-04-06

## 2022-04-06 RX ORDER — SODIUM CHLORIDE 9 MG/ML
1000 INJECTION, SOLUTION INTRAVENOUS
Refills: 0 | Status: DISCONTINUED | OUTPATIENT
Start: 2022-04-06 | End: 2022-04-06

## 2022-04-06 RX ADMIN — AMPICILLIN SODIUM AND SULBACTAM SODIUM 200 GRAM(S): 250; 125 INJECTION, POWDER, FOR SUSPENSION INTRAMUSCULAR; INTRAVENOUS at 18:50

## 2022-04-06 RX ADMIN — OXYCODONE HYDROCHLORIDE 5 MILLIGRAM(S): 5 TABLET ORAL at 09:35

## 2022-04-06 RX ADMIN — ENOXAPARIN SODIUM 40 MILLIGRAM(S): 100 INJECTION SUBCUTANEOUS at 08:18

## 2022-04-06 RX ADMIN — Medication 200 MILLIGRAM(S): at 05:37

## 2022-04-06 RX ADMIN — Medication 62.5 MILLIMOLE(S): at 09:49

## 2022-04-06 RX ADMIN — Medication 50 MILLIEQUIVALENT(S): at 03:03

## 2022-04-06 RX ADMIN — ONDANSETRON 4 MILLIGRAM(S): 8 TABLET, FILM COATED ORAL at 08:17

## 2022-04-06 RX ADMIN — Medication 100 MILLIGRAM(S): at 14:01

## 2022-04-06 RX ADMIN — OXYCODONE HYDROCHLORIDE 5 MILLIGRAM(S): 5 TABLET ORAL at 08:17

## 2022-04-06 RX ADMIN — ONDANSETRON 4 MILLIGRAM(S): 8 TABLET, FILM COATED ORAL at 14:01

## 2022-04-06 RX ADMIN — Medication 100 MILLIGRAM(S): at 05:37

## 2022-04-06 RX ADMIN — SODIUM CHLORIDE 120 MILLILITER(S): 9 INJECTION, SOLUTION INTRAVENOUS at 21:30

## 2022-04-06 RX ADMIN — PANTOPRAZOLE SODIUM 40 MILLIGRAM(S): 20 TABLET, DELAYED RELEASE ORAL at 15:54

## 2022-04-06 NOTE — CONSULT NOTE ADULT - SUBJECTIVE AND OBJECTIVE BOX
HISTORY OF PRESENT ILLNESS: (surgery/cardio)    66F PMH of HLD, TIA, kidney stones, PSH of L3-5 spinal fusion 1.5 years ago presenting with 2.5 days of epigastric abdominal pain with associated nausea, no emesis. This is the first episode of this abdominal pain. Patient presented to the ED on 4/3 for similar epigastric abdominal pain, at that time patient was thought to have ACS however stress test without any ischemic changes. Returned to the ED 4/5. Abdominal US revealed cholelithiases with possible cholecystitis. Admitted for Surgery for Laparoscopic Cholecystectomy.  Patient clearly functional to level of >4 METS prior to admission, major inpatient complaint is nausea.        Planned Procedure__laparascopic cholecystectomy__    Does the patient have the following conditions? Type Y or N    __n__DVT/PE           	  Currently anticoagulated ______ IVC Filter _______ Date:______    _n___DM                             Controlled    Y _______ N _______    _n___HTN	                              Controlled    Y _______ N _______    ___n_CAD	                                  Prior MI  _____CABG _____STENT  ______ EF _____    _n___CHF                             Chronic _____ Acute _____ EF ______    _n___Afib	                                  Current Rhythm _________   Current anticoagulation _________    _n___PPM/ICD                         Indication ___________  last Interrogated _________    __n__OSA	                              PAP  Type &Settings _____________    _n___Asthma/COPD	          Last Exac _______ Last Steroid use Date _______     __n__O2 dependent	          Reason ______________    _n___CKD or CATE	                  Stable Y _____  N ______    _n___Electrolyte Abn	          Type ___________     Stable Y ______   N _______    _n___Cirrhosis	                  Cause __________     Stable Y ______   N _______    _n___GI Bleed                          Cause __________     Stable Y ______   N _______    __y__Anemia	                          Cause __ivf________     Stable Y __x____   N _______    __n__Transfusion                  Last date ________      _n___ETOH Use	                  Last date________     Intervention __________________________    _n___Tobacco Abuse	          Last date ________    Intervention __________________________    _n___Drug Use	                 Type____________  Last dose _____ Intervention______________      MEDICATIONS  (STANDING):  ciprofloxacin   IVPB 400 milliGRAM(s) IV Intermittent every 12 hours  enoxaparin Injectable 40 milliGRAM(s) SubCutaneous every 24 hours  lactated ringers. 1000 milliLiter(s) (120 mL/Hr) IV Continuous <Continuous>  metroNIDAZOLE  IVPB 500 milliGRAM(s) IV Intermittent every 8 hours  pantoprazole  Injectable 40 milliGRAM(s) IV Push daily    MEDICATIONS  (PRN):  acetaminophen     Tablet .. 650 milliGRAM(s) Oral every 6 hours PRN Temp greater or equal to 38C (100.4F), Mild Pain (1 - 3)  ibuprofen  Tablet. 400 milliGRAM(s) Oral every 6 hours PRN Temp greater or equal to 38C (100.4F), Mild Pain (1 - 3)  ondansetron Injectable 4 milliGRAM(s) IV Push every 8 hours PRN Nausea and/or Vomiting  oxyCODONE    IR 5 milliGRAM(s) Oral every 6 hours PRN Moderate Pain (4 - 6)    ALLERGIES: penicillin (Unknown)      Duke Activity Status Index: Can the patient climb a flight of stairs or walk uphill?   ______ N   <4 METS   ___x____ Y > 4 METS    Physical Exam:  Vital Signs Last 24 Hrs  T(C): 36.8 (06 Apr 2022 12:22), Max: 37.1 (06 Apr 2022 05:00)  T(F): 98.3 (06 Apr 2022 12:22), Max: 98.7 (06 Apr 2022 05:00)  HR: 81 (06 Apr 2022 12:22) (67 - 88)  BP: 159/74 (06 Apr 2022 12:22) (117/64 - 159/74)  RR: 18 (06 Apr 2022 12:22) (18 - 18)  SpO2: 98% (06 Apr 2022 12:22) (97% - 99%)    HEENT: EOMI    Chest: CTA B/L    CV: RRR S1S2    Abd: +BS, SOFT, NT, ND    Neuro: 5/5 B/L    Psych: a x o x 3    LABS AND OTHER PERTINENT TESTS:                          10.8   7.53  )-----------( 282      ( 05 Apr 2022 23:30 )             33.3     04-05    137  |  101  |  11  ----------------------------<  125<H>  4.1   |  24  |  0.5<L>    Ca    10.0      05 Apr 2022 23:30  Phos  2.7     04-05  Mg     2.3     04-05    TPro  6.4  /  Alb  4.3  /  TBili  1.0  /  DBili  0.2  /  AST  14  /  ALT  15  /  AlkPhos  68  04-05    RADIOLOGY:  -CXR:    No radiographic evidence of acute cardiopulmonary disease.    -STRESS TEST:  < from: NM Nuclear Stress Pharmacologic Multiple (04.04.22 @ 12:22) >    Impression:  1. NORMAL LEXISCAN / REST MYOCARDIAL PERFUSION SPECT TOMOGRAPHY, WITH NO   EVIDENCE FOR ISCHEMIA DURING LEXISCAN INFUSION.  2. NORMAL RESTING LEFT VENTRICULAR WALL MOTION AND WALL THICKENING.  3. LEFT VENTRICULAR EJECTION FRACTION OF  80 % WHICH IS WITHIN RANGE OF   NORMAL.    ECG:   Normal sinus rhythm      Risk Assessment: (Use One)    [employer] NSQIP Surgical Risk Calculator http://riskcalculator.facs.org/  BELOW AVERAGE        ___X_____  The patient is optimized for surgery/procedure and may proceed to the operating room as scheduled    _________The patient is NOT optimized for surgery/procedure and should not proceed to the operating room as scheduled      Recommendations for optimization: NONE    Anticoagulation Recommendations: PER SURGERY

## 2022-04-06 NOTE — PROGRESS NOTE ADULT - ATTENDING COMMENTS
67yo female with PMHx/PSHx of HLD, TIA, nephrolithiasis, spinal fusion L3-5 presenting with abdominal pain. Associated nausea, no vomiting, fever/chills. On exam, patient has RUQ tenderness without peritonitis, no scars, no hernias, no masses. Labs reviewed - WBC 8.41, total bilirubin 1.0, AST/ALT 18/18, AP 75, lipase 24. CT A/P demonstrates gallbladder distention, wall edema and cholelithiasis suggestive of cholecystitis. Plan for laparoscopic cholecystectomy. Risks, benefits, and alternatives were explained the patient, including bleeding, infection, hernia, and injury to neighboring structures. All questions were answered.

## 2022-04-06 NOTE — CONSULT NOTE ADULT - SUBJECTIVE AND OBJECTIVE BOX
Date of Admission: 04-05-22    CHIEF COMPLAINT:     HISTORY OF PRESENT ILLNESS:     66F PMH of HLD, TIA, kidney stones, PSH of L3-5 spinal fusion 1.5 years ago presenting with 2.5 days of epigastric abdominal pain with associated nausea, no emesis. This is the first episode of this abdominal pain. Patient presented to the ED on 4/3 for similar epigastric abdominal pain, at that time patient was thought to have ACS however stress test without any ischemic changes. Returned to the ED 4/5. Abdominal US revealed cholelithiases with possible cholecystitis. Admitted for Surgery for Laparoscopic Cholecystectomy. Cardiology consulted for pre op clearance.      PAST MEDICAL & SURGICAL HISTORY  HLD  TIA  L3-L5 spinal fusion     FAMILY HISTORY:  FAMILY HISTORY:  no signficiant     SOCIAL HISTORY:  x]smoker  [x]Alcohol  [x]Drug    ROS:  Negative except as mentioned in HPI    ALLERGIES:  penicillin (Unknown)      MEDICATIONS:  MEDICATIONS  (STANDING):  ciprofloxacin   IVPB 400 milliGRAM(s) IV Intermittent every 12 hours  enoxaparin Injectable 40 milliGRAM(s) SubCutaneous every 24 hours  lactated ringers. 1000 milliLiter(s) (120 mL/Hr) IV Continuous <Continuous>  metroNIDAZOLE  IVPB 500 milliGRAM(s) IV Intermittent every 8 hours    MEDICATIONS  (PRN):  acetaminophen     Tablet .. 650 milliGRAM(s) Oral every 6 hours PRN Temp greater or equal to 38C (100.4F), Mild Pain (1 - 3)  ibuprofen  Tablet. 400 milliGRAM(s) Oral every 6 hours PRN Temp greater or equal to 38C (100.4F), Mild Pain (1 - 3)  oxyCODONE    IR 5 milliGRAM(s) Oral every 6 hours PRN Moderate Pain (4 - 6)      HOME MEDICATIONS:  Home Medications:      VITALS:   T(F): 98.3 (04-06 @ 08:58), Max: 98.8 (04-03 @ 23:52)  HR: 81 (04-06 @ 08:58) (67 - 89)  BP: 138/63 (04-06 @ 08:58) (112/74 - 144/75)  BP(mean): --  RR: 18 (04-06 @ 08:58) (17 - 20)  SpO2: 98% (04-06 @ 08:58) (96% - 100%)    I&O's Summary      PHYSICAL EXAM:  GEN: Not in acute distress  HEENT: NCAT, PERRL, EOMI  LUNGS: Clear to auscultation bilaterally   CARDIOVASCULAR: RRR, S1/S2 present, no murmurs, rubs or gallops, no JVD, + PP bilaterally  ABD: Soft, non-tender, non-distended  EXT: No GIOVANNI  SKIN: Intact  NEURO: AAOx3    LABS:                        10.8   7.53  )-----------( 282      ( 05 Apr 2022 23:30 )             33.3     04-05    137  |  101  |  11  ----------------------------<  125<H>  4.1   |  24  |  0.5<L>    Ca    10.0      05 Apr 2022 23:30  Phos  2.7     04-05  Mg     2.3     04-05    TPro  6.4  /  Alb  4.3  /  TBili  1.0  /  DBili  0.2  /  AST  14  /  ALT  15  /  AlkPhos  68  04-05      Lactate, Blood: 1.6 mmol/L (04-05-22 @ 23:30)  Lactate, Blood: 0.9 mmol/L (04-05-22 @ 16:14)  Troponin T, Serum: <0.01 ng/mL (04-05-22 @ 16:14)    Troponin <0.01, CKMB --, CK --/ 04-05-22 @ 16:14  Troponin <0.01, CKMB --, CK --/ 04-04-22 @ 00:58  Troponin <0.01, CKMB --, CK --/ 04-03-22 @ 21:00        Hemoglobin A1C   Thyroid      RADIOLOGY:  -CXR:  < from: Xray Chest 1 View- PORTABLE-Urgent (04.03.22 @ 21:36) >    Impression:    No radiographic evidence of acute cardiopulmonary disease.    < end of copied text >      -STRESS TEST:  < from: NM Nuclear Stress Pharmacologic Multiple (04.04.22 @ 12:22) >    INTERPRETATION:  LEXISCAN AND REST MYOCARDIAL PERFUSION SPECT TOMOGRAPHY,   WALL MOTION ANALYSIS, LVEF CALCULATION  Reason: Chest pain,abnormal ECG  8 Millicuries 99 technetium sestamibi was injected intravenously at   rest,and SPECT myocardial perfusion images were acquired over a 180   degree arc.  Then, lexiscan infusion was begun by Cardiology, and at the   end of infusion, 25.2millicuries 99m technetium sestamibi was injected   intravenously, and SPECT myocardial perfusion images were acquired in the   same manner as the resting study.  On viewing a cinematic display of the planar images, there is no   significant patient motion.  On viewing the tomographic images in color and black and white, bullseye   polar map, and three-dimensional surface reconstruction, there is normal   isotope distribution throughout the myocardium on both sets of   acquisitions, with no evidence for ischemia during lexiscan infusion.  The lexiscan study was acquired as a gated study.  On viewing a cinematic   display of the frames, there is normal resting left ventricular wall   motion and wall thickening.  Analysis of the ventriculogram generates a calculated left ventricular   ejection fraction of 80 % which is within range of normal.  Impression:  1. NORMAL LEXISCAN / REST MYOCARDIAL PERFUSION SPECT TOMOGRAPHY, WITH NO   EVIDENCE FOR ISCHEMIA DURING LEXISCAN INFUSION.  2. NORMAL RESTING LEFT VENTRICULAR WALL MOTION AND WALL THICKENING.  3. LEFT VENTRICULAR EJECTION FRACTION OF  80 % WHICH IS WITHIN RANGE OF   NORMAL.    < end of copied text >      ECG:    < from: 12 Lead ECG (04.05.22 @ 17:20) >  Diagnosis Line Normal sinus rhythm  Left axis deviation  Low voltage QRS  Cannot rule out Anterior infarct , age undetermined  Abnormal ECG    < end of copied text >     Date of Admission: 04-05-22      CHIEF COMPLAINT:     HISTORY OF PRESENT ILLNESS:     66F PMH of HLD, TIA, kidney stones, PSH of L3-5 spinal fusion 1.5 years ago presenting with 2.5 days of epigastric abdominal pain with associated nausea, no emesis. This is the first episode of this abdominal pain. Patient presented to the ED on 4/3 for similar epigastric abdominal pain, at that time patient was thought to have ACS however stress test without any ischemic changes. Returned to the ED 4/5. Abdominal US revealed cholelithiases with possible cholecystitis. Admitted for Surgery for Laparoscopic Cholecystectomy. Cardiology consulted for pre op clearance.      PAST MEDICAL & SURGICAL HISTORY  HLD  TIA  L3-L5 spinal fusion     FAMILY HISTORY:  FAMILY HISTORY:  no signficiant     SOCIAL HISTORY:  x]smoker  [x]Alcohol  [x]Drug    ROS:  Negative except as mentioned in HPI    ALLERGIES:  penicillin (Unknown)      MEDICATIONS:  MEDICATIONS  (STANDING):  ciprofloxacin   IVPB 400 milliGRAM(s) IV Intermittent every 12 hours  enoxaparin Injectable 40 milliGRAM(s) SubCutaneous every 24 hours  lactated ringers. 1000 milliLiter(s) (120 mL/Hr) IV Continuous <Continuous>  metroNIDAZOLE  IVPB 500 milliGRAM(s) IV Intermittent every 8 hours    MEDICATIONS  (PRN):  acetaminophen     Tablet .. 650 milliGRAM(s) Oral every 6 hours PRN Temp greater or equal to 38C (100.4F), Mild Pain (1 - 3)  ibuprofen  Tablet. 400 milliGRAM(s) Oral every 6 hours PRN Temp greater or equal to 38C (100.4F), Mild Pain (1 - 3)  oxyCODONE    IR 5 milliGRAM(s) Oral every 6 hours PRN Moderate Pain (4 - 6)      HOME MEDICATIONS:  Home Medications:      VITALS:   T(F): 98.3 (04-06 @ 08:58), Max: 98.8 (04-03 @ 23:52)  HR: 81 (04-06 @ 08:58) (67 - 89)  BP: 138/63 (04-06 @ 08:58) (112/74 - 144/75)  BP(mean): --  RR: 18 (04-06 @ 08:58) (17 - 20)  SpO2: 98% (04-06 @ 08:58) (96% - 100%)    I&O's Summary      PHYSICAL EXAM:  GEN: Not in acute distress  HEENT: NCAT, PERRL, EOMI  LUNGS: Clear to auscultation bilaterally   CARDIOVASCULAR: RRR, S1/S2 present, no murmurs, rubs or gallops, no JVD, + PP bilaterally  ABD: Soft, non-tender, non-distended  EXT: No GIOVANNI  SKIN: Intact  NEURO: AAOx3    LABS:                        10.8   7.53  )-----------( 282      ( 05 Apr 2022 23:30 )             33.3     04-05    137  |  101  |  11  ----------------------------<  125<H>  4.1   |  24  |  0.5<L>    Ca    10.0      05 Apr 2022 23:30  Phos  2.7     04-05  Mg     2.3     04-05    TPro  6.4  /  Alb  4.3  /  TBili  1.0  /  DBili  0.2  /  AST  14  /  ALT  15  /  AlkPhos  68  04-05      Lactate, Blood: 1.6 mmol/L (04-05-22 @ 23:30)  Lactate, Blood: 0.9 mmol/L (04-05-22 @ 16:14)  Troponin T, Serum: <0.01 ng/mL (04-05-22 @ 16:14)    Troponin <0.01, CKMB --, CK --/ 04-05-22 @ 16:14  Troponin <0.01, CKMB --, CK --/ 04-04-22 @ 00:58  Troponin <0.01, CKMB --, CK --/ 04-03-22 @ 21:00        Hemoglobin A1C   Thyroid      RADIOLOGY:  -CXR:  < from: Xray Chest 1 View- PORTABLE-Urgent (04.03.22 @ 21:36) >    Impression:    No radiographic evidence of acute cardiopulmonary disease.    < end of copied text >      -STRESS TEST:  < from: NM Nuclear Stress Pharmacologic Multiple (04.04.22 @ 12:22) >    INTERPRETATION:  LEXISCAN AND REST MYOCARDIAL PERFUSION SPECT TOMOGRAPHY,   WALL MOTION ANALYSIS, LVEF CALCULATION  Reason: Chest pain,abnormal ECG  8 Millicuries 99 technetium sestamibi was injected intravenously at   rest,and SPECT myocardial perfusion images were acquired over a 180   degree arc.  Then, lexiscan infusion was begun by Cardiology, and at the   end of infusion, 25.2millicuries 99m technetium sestamibi was injected   intravenously, and SPECT myocardial perfusion images were acquired in the   same manner as the resting study.  On viewing a cinematic display of the planar images, there is no   significant patient motion.  On viewing the tomographic images in color and black and white, bullseye   polar map, and three-dimensional surface reconstruction, there is normal   isotope distribution throughout the myocardium on both sets of   acquisitions, with no evidence for ischemia during lexiscan infusion.  The lexiscan study was acquired as a gated study.  On viewing a cinematic   display of the frames, there is normal resting left ventricular wall   motion and wall thickening.  Analysis of the ventriculogram generates a calculated left ventricular   ejection fraction of 80 % which is within range of normal.  Impression:  1. NORMAL LEXISCAN / REST MYOCARDIAL PERFUSION SPECT TOMOGRAPHY, WITH NO   EVIDENCE FOR ISCHEMIA DURING LEXISCAN INFUSION.  2. NORMAL RESTING LEFT VENTRICULAR WALL MOTION AND WALL THICKENING.  3. LEFT VENTRICULAR EJECTION FRACTION OF  80 % WHICH IS WITHIN RANGE OF   NORMAL.    < end of copied text >      ECG:    < from: 12 Lead ECG (04.05.22 @ 17:20) >  Diagnosis Line Normal sinus rhythm  Left axis deviation  Low voltage QRS  Cannot rule out Anterior infarct , age undetermined  Abnormal ECG    < end of copied text >

## 2022-04-06 NOTE — PRE-ANESTHESIA EVALUATION ADULT - NSANTHSUBSTSD_GEN_ALL_CORE
HPI





- HPI


Patient complains to provider of: cough


Pain Level: 2


Context: 


Patient is a 4 year 8-month-old male who presents with right ear pain and 

drainage and cough for the past 3 days.  Mother states that patient was 

recently exposed to her grandson who was diagnosed with upper respiratory 

infection last week. she is concerned for croup since he had a barking cough.  

She states that the patient has had dry cough, been using albuterol with mild 

improvement in his symptoms.  He has had fevers at home but patient afebrile 

here.  He denies any respiratory distress, shortness of breath.  patient 

history of asthma.  Autism and cerebral plasy. PEG tube for failure to thrive.





PCP: ta





- DERM


Skin Color: Normal





Past Medical History





- Social History


Smoking Status: Never Smoker


Chew tobacco use (# tins/day): No


Frequency of alcohol use: None


Drug Abuse: None


Family History: Reviewed & Not Pertinent


Pulmonary Medical History: Reports: Hx Asthma, Hx Tuberculosis


   Denies: Hx Pneumonia


Neurological Medical History: Reports: Hx Seizures


Renal/ Medical History: Denies: Hx Peritoneal Dialysis


GI Medical History: Reports: Hx Gastroesophageal Reflux Disease - has G-Tube


Traumatic Medical History: Reports: Hx Fractures - left forearm


Infectious Medical History: Denies: Hx MRSA


Past Surgical History: Reports: Hx Abdominal Surgery - G-tube, Hx Adenoidectomy

, Hx Bowel Surgery - PEG tube., Hx Myringotomy, Hx Tonsillectomy





- Immunizations


Immunizations up to date: Yes


Hx Diphtheria, Pertussis, Tetanus Vaccination: Yes


Hx Pneumococcal Vaccination: 01/01/13





Vertical Provider Document





- CONSTITUTIONAL


Agree With Documented VS: Yes


Exam Limitations: No Limitations


General Appearance: WD/WN, No Apparent Distress


Notes: 


GENERAL: appears well, alert, attentiveness normal, consolable, good eye contact

, NAD


HEENT: NCAT, pale conjunctiva, extraocular movements intact, pupils PERRL. 

external ear normal, no evidence of external auditory canal tenderness, minimal 

blood in right ear canal but tube intacts without evidence of purulent drainage

, no cerumen impaction, TM intact without evidence of effusion, bulging, 

injection, MMM


RESP: no respiratory distress, chest nontender, normal breath sounds evidence 

of wheezing, rhonchi, rales


CARDIAC: Regular rate and rhythm.  S1 and S2 appreciated no evidence, murmur, 

rub.  Brachial pulse normal, normal cap refill


ABDOMEN: Normal inspection, no distention, nontender, normal bowel sounds, no 

organomegaly or masses


EXTREMITIES: Normal inspection, nontender, no evidence of edema, normal range 

of motion and strength, normal temperature.


NEURO: neuro grossly intact. spontaneous eye opening, age appropriate verbal 

and spontaneous movements


SKIN: warm , dry, normal color, elastic without irregularities





- INFECTION CONTROL


TRAVEL OUTSIDE OF THE [place] IN LAST 30 DAYS: No





- RESPIRATORY


O2 Sat by Pulse Oximetry: 100





Course





- Re-evaluation


Re-evalutation: 





05/29/17 10:43


The patient appears non-toxic and well hydrated. There are no signs of life 

threatening or serious infection at this time. The parents / guardian have been 

instructed to return if the child appears to be getting more seriously ill in 

any way. d/c home after dexamethasone IM for asthma exacerbation





- Vital Signs


Vital signs: 


 











Temp Pulse Resp BP Pulse Ox


 


 99.1 F   104   18 L     100 


 


 05/29/17 08:01  05/29/17 08:01  05/29/17 08:01     05/29/17 08:01














- Diagnostic Test


Radiology reviewed: Reports reviewed





Discharge





- Discharge


Clinical Impression: 


 Asthma attack





Condition: Good


Disposition: HOME, SELF-CARE


Additional Instructions: 


ASTHMA:





     You have been diagnosed as having asthma.  This is a condition where there 

is episodic tightness in the bronchial tubes.  Allergies, infections, and 

polluted or cold air may be contributing factors.


     Emergency treatment of a severe asthma attack may include adrenaline shots

, or bronchodilator aerosol.  You may feel lightheaded, have a decreased 

exercise tolerance and a rapid pulse for an hour or two.  Rest and get plenty 

of fluids.


     Home treatment of asthma requires bronchodilator drugs.  These can be 

administered by injection, inhalation, or by mouth.  Antibiotics and 

corticosteroids may be required for some patients.  You should avoid chemical 

fumes, dusts, pollens, and exercising in very cold or dry air. If you smoke, 

stop!!


     If you develop a fever, increased wheezing, chest pain, or severe 

shortness of breath, you should contact the doctor immediately.








STEROID MEDICATION:


     You have been given an injection of or oral medicine of the cortisone/

steroid class.  This medication is used to control inflammation or allergy.  

Luis t is usually only given for a short period of time, until the acute process 

subsides.


     There are usually no side effects from short-term use of cortisone-like 

medications.  Some persons feel an increased sense of well-being and are not 

sleepy at bedtime.  Long-term use of cortisone medications is best avoided, 

unless required for a severe condition.  If your condition does not remit, or 

relapses after the course of corticosteroid medication, you should consult your 

physician.








INHALED BRONCHODILATORS:


     You have received treatment(s) of and/or prescription for an inhaled 

bronchodilator -- a medication which stimulates the airways in the lung to 

dilate.  This improves the flow of air in asthma, bronchitis, and emphysema.


     These medicines have some similarity to adrenaline, and can cause similar 

side effects:  shakiness, racing heart, and a sense of nervousness.  These side 

effects decrease with time.  Contact your doctor if these side effects are 

severe.


     Do not over-use the medicine.  Too-frequent use of the inhaler may make it 

ineffective.  Call your doctor if the inhaler is not controlling your symptoms 

at the prescribed doses.








USE OF ACETAMINOPHEN:


     Acetaminophen may be taken for pain relief or fever control. It's much 

safer than aspirin, offering a wider range of "safe" dosages.  It is safe 

during pregnancy.  Some brand names are Tylenol, Panadol, Datril, Anacin 3, 

Tempra, and Liquiprin. Acetaminophen can be repeated every four hours.  The 

following are maximum recommended dosages:








USE OF ACETAMINOPHEN (Tylenol):


     Acetaminophen may be taken for pain relief or fever control. It's much 

safer than aspirin, offering a wider range of "safe" dosages.  It is safe 

during pregnancy.  Some brand names are Tylenol, Panadol, Datril, Anacin 3, 

Tempra, and Liquiprin. Acetaminophen can be repeated every four hours.  The 

following are maximum recommended dosages:





WEIGHT         Dose             Drops                  Elixir        Chewable(

80mg)


(LBS.)                            drprs=droppers    tsp=teaspoon


6               40 mg            0.4 ml (1/2)


6-11            80 mg            0.8 ml (full)              tsp               

   1       tab


12-16         120 mg           1 1/2 drprs             3/4  tsp               1 

1/2  tabs


17-23         160 mg             2  drprs             1    tsp                 

  2       tabs


24-30         240 mg             3  drprs             1 1/2 tsp                

3       tabs


30-35         320 mg                                       2    tsp            

       4       tabs


36-41         360 mg                                       2 1/4   tsp         

     4 1/2 tabs


42-47         400 mg                                       2 1/2   tsp         

     5      tabs


48-53         480 mg                                       3    tsp            

       6      tabs


54-59         520 mg                                       3  1/4  tsp         

     6 1/2 tabs


60-64         560 mg                                       3  1/2  tsp         

     7      tabs 


65-70         600 mg                                       3  3/4  tsp         

     7 1/2 tabs


71-76         640 mg                                       4   tsp             

      8      tabs


77-82         720 mg                                       4 1/2   tsp         

    9      tabs


83-88         800 mg                                       5   tsp             

    10      tabs





>89 pounds or adults          650 mg to 900 mg





Acetaminophen can be repeated every four hours.  Maximum dose not to exceed 

4000 mg a day.





   These maximum recommended dosages are slightly higher than the dosages 

written on the product container, but these dosages are very safe and below the 

toxic dosage for acetaminophen.





FOLLOW-UP CARE:


If you have been referred to a physician for follow-up care, call the physician

s office for an appointment as you were instructed or within the next two days.

  If you experience worsening or a significant change in your symptoms, notify 

the physician immediately or return to the Emergency Department at any time for 

re-evaluation.


Referrals: 


HEAVENLY ROSEN MD [Primary Care Provider] - Follow up tomorrow No

## 2022-04-06 NOTE — PRE-ANESTHESIA EVALUATION ADULT - NSANTHPMHFT_GEN_ALL_CORE
66F PMH of HLD, TIA, kidney stones, PSH of L3-5 spinal fusion 1.5 years ago presenting with 2.5 days of epigastric abdominal pain with associated nausea, no emesis. This is the first episode of this abdominal pain. Patient presented to the ED on 4/3 for similar epigastric abdominal pain, at that time patient was thought to have ACS however stress test without any ischemic changes. Returned to the ED 4/5. Abdominal US revealed cholelithiases with possible cholecystitis. Admitted for Surgery for Laparoscopic Cholecystectomy. Cardiology consulted for pre op clearance.

## 2022-04-06 NOTE — PRE-ANESTHESIA EVALUATION ADULT - NSRADCARDRESULTSFT_GEN_ALL_CORE
# pre op eval for laparoscopic cholecystomy:     - No history of CAD   - Nuclear Stress test 4/4:    1. NORMAL LEXISCAN / REST MYOCARDIAL PERFUSION SPECT TOMOGRAPHY, WITH NO EVIDENCE FOR ISCHEMIA DURING LEXISCAN INFUSION.    2. NORMAL RESTING LEFT VENTRICULAR WALL MOTION AND WALL THICKENING.    3. LEFT VENTRICULAR EJECTION FRACTION OF  80 % WHICH IS WITHIN RANGE OF NORMAL.  - METS > 4  - RCRI 2     No cardiac contraindications for the planned surgical procedure.  No further cardiac w/u required prior to OR.  Recall as needed.  Usual precautions

## 2022-04-06 NOTE — PRE-ANESTHESIA EVALUATION ADULT - NSDENTALSD_ENT_ALL_CORE
Addended by: EVELIN MEDRANO on: 7/17/2019 10:31 AM     Modules accepted: Orders     appears normal and intact

## 2022-04-06 NOTE — PROGRESS NOTE ADULT - ASSESSMENT
ASSESSMENT:  66F PMH of HLD, TIA, kidney stones, PSH of L3-5 spinal fusion 1.5 years ago presenting with 2.5 days of epigastric abdominal pain with associated nausea, no emesis on exam palpable gallbladder correlated to imaging with distended gallbladder concerning for early acute cholecystitis    Plan  -OR tomorrow for Laparoscopic cholecystectomy, possible open  -NPO w/ IVF at midnight   -Preop today   -IV abx  -Pain control  -DVT/GI ppx    BLUE TEAM SPECTRA: [id number]

## 2022-04-06 NOTE — CONSULT NOTE ADULT - ASSESSMENT
# pre op eval for laparoscopic cholecystomy   - No history of CAD   - Nuclear Stress test 4/4:    1. NORMAL LEXISCAN / REST MYOCARDIAL PERFUSION SPECT TOMOGRAPHY, WITH NO EVIDENCE FOR ISCHEMIA DURING LEXISCAN INFUSION.    2. NORMAL RESTING LEFT VENTRICULAR WALL MOTION AND WALL THICKENING.    3. LEFT VENTRICULAR EJECTION FRACTION OF  80 % WHICH IS WITHIN RANGE OF NORMAL.  - METS > 4  - RCRI 2  # pre op eval for laparoscopic cholecystomy:     - No history of CAD   - Nuclear Stress test 4/4:    1. NORMAL LEXISCAN / REST MYOCARDIAL PERFUSION SPECT TOMOGRAPHY, WITH NO EVIDENCE FOR ISCHEMIA DURING LEXISCAN INFUSION.    2. NORMAL RESTING LEFT VENTRICULAR WALL MOTION AND WALL THICKENING.    3. LEFT VENTRICULAR EJECTION FRACTION OF  80 % WHICH IS WITHIN RANGE OF NORMAL.  - METS > 4  - RCRI 2     No cardiac contraindications for the planned surgical procedure.  No further cardiac w/u required prior to OR.  Recall as needed.  Usual precautions

## 2022-04-07 ENCOUNTER — RESULT REVIEW (OUTPATIENT)
Age: 67
End: 2022-04-07

## 2022-04-07 PROCEDURE — 88304 TISSUE EXAM BY PATHOLOGIST: CPT | Mod: 26

## 2022-04-07 PROCEDURE — 99024 POSTOP FOLLOW-UP VISIT: CPT

## 2022-04-07 PROCEDURE — 47562 LAPAROSCOPIC CHOLECYSTECTOMY: CPT

## 2022-04-07 RX ORDER — DEXAMETHASONE 0.5 MG/5ML
8 ELIXIR ORAL ONCE
Refills: 0 | Status: DISCONTINUED | OUTPATIENT
Start: 2022-04-07 | End: 2022-04-07

## 2022-04-07 RX ORDER — BUPIVACAINE 13.3 MG/ML
20 INJECTION, SUSPENSION, LIPOSOMAL INFILTRATION ONCE
Refills: 0 | Status: DISCONTINUED | OUTPATIENT
Start: 2022-04-07 | End: 2022-04-07

## 2022-04-07 RX ORDER — IBUPROFEN 200 MG
400 TABLET ORAL EVERY 6 HOURS
Refills: 0 | Status: DISCONTINUED | OUTPATIENT
Start: 2022-04-07 | End: 2022-04-08

## 2022-04-07 RX ORDER — LANOLIN ALCOHOL/MO/W.PET/CERES
3 CREAM (GRAM) TOPICAL AT BEDTIME
Refills: 0 | Status: DISCONTINUED | OUTPATIENT
Start: 2022-04-07 | End: 2022-04-08

## 2022-04-07 RX ORDER — SCOPALAMINE 1 MG/3D
1 PATCH, EXTENDED RELEASE TRANSDERMAL
Refills: 0 | Status: COMPLETED | OUTPATIENT
Start: 2022-04-07 | End: 2022-04-07

## 2022-04-07 RX ORDER — METOCLOPRAMIDE HCL 10 MG
10 TABLET ORAL ONCE
Refills: 0 | Status: DISCONTINUED | OUTPATIENT
Start: 2022-04-07 | End: 2022-04-07

## 2022-04-07 RX ORDER — PANTOPRAZOLE SODIUM 20 MG/1
40 TABLET, DELAYED RELEASE ORAL DAILY
Refills: 0 | Status: DISCONTINUED | OUTPATIENT
Start: 2022-04-07 | End: 2022-04-08

## 2022-04-07 RX ORDER — OXYCODONE HYDROCHLORIDE 5 MG/1
5 TABLET ORAL EVERY 6 HOURS
Refills: 0 | Status: DISCONTINUED | OUTPATIENT
Start: 2022-04-07 | End: 2022-04-08

## 2022-04-07 RX ORDER — ONDANSETRON 8 MG/1
4 TABLET, FILM COATED ORAL ONCE
Refills: 0 | Status: DISCONTINUED | OUTPATIENT
Start: 2022-04-07 | End: 2022-04-07

## 2022-04-07 RX ORDER — APREPITANT 80 MG/1
40 CAPSULE ORAL ONCE
Refills: 0 | Status: COMPLETED | OUTPATIENT
Start: 2022-04-07 | End: 2022-04-07

## 2022-04-07 RX ORDER — INDOCYANINE GREEN 25 MG
0.5 KIT INTRAVASCULAR; INTRAVENOUS ONCE
Refills: 0 | Status: DISCONTINUED | OUTPATIENT
Start: 2022-04-07 | End: 2022-04-07

## 2022-04-07 RX ORDER — HYDROMORPHONE HYDROCHLORIDE 2 MG/ML
0.5 INJECTION INTRAMUSCULAR; INTRAVENOUS; SUBCUTANEOUS
Refills: 0 | Status: DISCONTINUED | OUTPATIENT
Start: 2022-04-07 | End: 2022-04-07

## 2022-04-07 RX ORDER — HYDROMORPHONE HYDROCHLORIDE 2 MG/ML
1 INJECTION INTRAMUSCULAR; INTRAVENOUS; SUBCUTANEOUS
Refills: 0 | Status: DISCONTINUED | OUTPATIENT
Start: 2022-04-07 | End: 2022-04-07

## 2022-04-07 RX ORDER — SODIUM CHLORIDE 9 MG/ML
1000 INJECTION, SOLUTION INTRAVENOUS
Refills: 0 | Status: DISCONTINUED | OUTPATIENT
Start: 2022-04-07 | End: 2022-04-07

## 2022-04-07 RX ORDER — ACETAMINOPHEN 500 MG
650 TABLET ORAL EVERY 6 HOURS
Refills: 0 | Status: DISCONTINUED | OUTPATIENT
Start: 2022-04-07 | End: 2022-04-08

## 2022-04-07 RX ORDER — POTASSIUM CHLORIDE 20 MEQ
20 PACKET (EA) ORAL
Refills: 0 | Status: COMPLETED | OUTPATIENT
Start: 2022-04-07 | End: 2022-04-07

## 2022-04-07 RX ORDER — MEPERIDINE HYDROCHLORIDE 50 MG/ML
12.5 INJECTION INTRAMUSCULAR; INTRAVENOUS; SUBCUTANEOUS
Refills: 0 | Status: DISCONTINUED | OUTPATIENT
Start: 2022-04-07 | End: 2022-04-07

## 2022-04-07 RX ORDER — ENOXAPARIN SODIUM 100 MG/ML
40 INJECTION SUBCUTANEOUS EVERY 24 HOURS
Refills: 0 | Status: DISCONTINUED | OUTPATIENT
Start: 2022-04-07 | End: 2022-04-08

## 2022-04-07 RX ADMIN — Medication 50 MILLIEQUIVALENT(S): at 04:30

## 2022-04-07 RX ADMIN — Medication 50 MILLIEQUIVALENT(S): at 02:00

## 2022-04-07 RX ADMIN — Medication 3 MILLIGRAM(S): at 23:13

## 2022-04-07 RX ADMIN — Medication 650 MILLIGRAM(S): at 15:16

## 2022-04-07 RX ADMIN — SCOPALAMINE 1 PATCH: 1 PATCH, EXTENDED RELEASE TRANSDERMAL at 07:19

## 2022-04-07 RX ADMIN — PANTOPRAZOLE SODIUM 40 MILLIGRAM(S): 20 TABLET, DELAYED RELEASE ORAL at 12:11

## 2022-04-07 RX ADMIN — AMPICILLIN SODIUM AND SULBACTAM SODIUM 200 GRAM(S): 250; 125 INJECTION, POWDER, FOR SUSPENSION INTRAMUSCULAR; INTRAVENOUS at 05:51

## 2022-04-07 RX ADMIN — AMPICILLIN SODIUM AND SULBACTAM SODIUM 200 GRAM(S): 250; 125 INJECTION, POWDER, FOR SUSPENSION INTRAMUSCULAR; INTRAVENOUS at 01:04

## 2022-04-07 RX ADMIN — ENOXAPARIN SODIUM 40 MILLIGRAM(S): 100 INJECTION SUBCUTANEOUS at 12:11

## 2022-04-07 RX ADMIN — Medication 650 MILLIGRAM(S): at 22:52

## 2022-04-07 RX ADMIN — SODIUM CHLORIDE 120 MILLILITER(S): 9 INJECTION, SOLUTION INTRAVENOUS at 10:03

## 2022-04-07 RX ADMIN — Medication 15 MILLIGRAM(S): at 01:03

## 2022-04-07 RX ADMIN — Medication 650 MILLIGRAM(S): at 15:45

## 2022-04-07 RX ADMIN — Medication 400 MILLIGRAM(S): at 20:30

## 2022-04-07 NOTE — CHART NOTE - NSCHARTNOTEFT_GEN_A_CORE
PACU ANESTHESIA ADMISSION NOTE      Procedure: Robot-assisted cholecystectomy      Post op diagnosis:  Acute cholecystitis        ____  Intubated  TV:______       Rate: ______      FiO2: ______    __x__  Patent Airway    _x___  Full return of protective reflexes    __x__  Full recovery from anesthesia / back to baseline status    Vitals:  temp(F) 98  /54  spo2 98  RR 17  pulse93    Mental Status:  __x __ Awake   _____ Alert   _____ Drowsy   _____ Sedated    Nausea/Vomiting:  ___x _ NO  ______Yes,   See Post - Op Orders          Pain Scale (0-10):  _____    Treatment: ____ None    x ____ See Post - Op/PCA Orders    Post - Operative Fluids:   ____ Oral   _x ___ See Post - Op Orders    Plan: Discharge:   ____Home       ___x __Floor     _____Critical Care    _____  Other:_________________    Comments: uneventful anesthesia course no complications. Vitals stable. Pt transferred to PACU

## 2022-04-07 NOTE — BRIEF OPERATIVE NOTE - OPERATION/FINDINGS
Robotic assisted cholecystectomy  Inflamed Gallbladder, non-gangrenous  XENIA drain placed in hepatic bed

## 2022-04-07 NOTE — PROGRESS NOTE ADULT - ASSESSMENT
ASSESSMENT:  66F PMH of HLD, TIA, kidney stones, PSH of L3-5 spinal fusion 1.5 years ago presenting with 2.5 days of epigastric abdominal pain with associated nausea, no emesis on exam palpable gallbladder correlated to imaging with distended gallbladder concerning for early acute cholecystitis. s/p robotic assisted cholecystectomy     Plan  -Low fat diet  -Monitor XENIA drain output  -Pain control  -Ambulate as tolerated  -Encourage incentive spirometry   -DVT/GI ppx    BLUE TEAM SPECTRA: [id number]   ASSESSMENT:  66F PMH of HLD, TIA, kidney stones, PSH of L3-5 spinal fusion 1.5 years ago presenting with 2.5 days of epigastric abdominal pain with associated nausea, no emesis on exam palpable gallbladder correlated to imaging with distended gallbladder concerning for early acute cholecystitis. s/p robotic assisted cholecystectomy     Plan  -Low fat diet  -Monitor XENIA drain output  -Pain control  -Ambulate as tolerated  -Encourage incentive spirometry   -DVT/GI ppx  -Patient will need VNS for drain care upon discharge    BLUE TEAM SPECTRA: [id number]

## 2022-04-07 NOTE — ASU PREOP CHECKLIST - NS PREOP CHK INSULIN PUMP THERAPY
After Visit Summary   5/18/2018    Toro Collazo    MRN: [id number]           Patient Information     Date Of Birth          2017        Visit Information        Provider Department      5/18/2018 3:00 PM Catalina Rosenberg MD [facility]        Today's Diagnoses     Encounter for routine child health examination w/o abnormal findings    -  1      Care Instructions    [facility]    If you have any questions regarding to your visit please contact your care team:       Team Red:   Clinic Hours Telephone Number   Dr. Noemy Rosenberg  (pediatrics)  Jessica Vidal NP 7am-7pm  Monday - Thursday   7am-5pm  Fridays  [phone] (fax)    Tamara MARS  [phone]   Urgent Care - [place] and [place] Monday-Friday  [place] - 11am-8pm  Saturday-Sunday  Both sites - 9am-5pm  [phone] - [facility]  [phone] - [place]       What options do I have for visits at the clinic other than the traditional office visit?  To expand how we care for you, many of our providers are utilizing electronic visits (e-visits) and telephone visits, when medically appropriate, for interactions with their patients rather than a visit in the clinic.   We also offer nurse visits for many medical concerns. Just like any other service, we will bill your insurance company for this type of visit based on time spent on the phone with your provider. Not all insurance companies cover these visits. Please check with your medical insurance if this type of visit is covered. You will be responsible for any charges that are not paid by your insurance.      E-visits via [employer]:  generally incur a $35.00 fee.  Telephone visits:  Time spent on the phone: *charged based on time that is spent on the phone in increments of 10 minutes. Estimated cost:   5-10 mins $30.00   11-20 mins. $59.00   21-30 mins. $85.00     As always, Thank you for  "trusting us with your health care needs!                  Preventive Care at the 15 Month Visit  Growth Measurements & Percentiles  Head Circumference: 18.6\" (47.2 cm) (87 %, Source: WHO (Girls, 0-2 years)) 87 %ile based on WHO (Girls, 0-2 years) head circumference-for-age data using vitals from 5/18/2018.   Weight: 18 lbs 8 oz / 8.39 kg (actual weight) / 13 %ile based on WHO (Girls, 0-2 years) weight-for-age data using vitals from 5/18/2018.    Length: 2' 5.5\" / 74.9 cm 16 %ile based on WHO (Girls, 0-2 years) length-for-age data using vitals from 5/18/2018.   Weight for length:17 %ile based on WHO (Girls, 0-2 years) weight-for-recumbent length data using vitals from 5/18/2018.    Your toddler s next Preventive Check-up will be at 18 months of age    Development  At this age, most children will:    feed herself    say four to 10 words    stand alone and walk    stoop to  a toy    roll or toss a ball    drink from a sippy cup or cup    Feeding Tips    Your toddler can eat table foods and drink milk and water each day.  If she is still using a bottle, it may cause problems with her teeth.  A cup is recommended.    Give your toddler foods that are healthy and can be chewed easily.    Your toddler will prefer certain foods over others. Don t worry -- this will change.    You may offer your toddler a spoon to use.  She will need lots of practice.    Avoid small, hard foods that can cause choking (such as popcorn, nuts, hot dogs and carrots).    Your toddler may eat five to six small meals a day.    Give your toddler healthy snacks such as soft fruit, yogurt, beans, cheese and crackers.    Toilet Training    This age is a little too young to begin toilet training for most children.  You can put a potty chair in the bathroom.  At this age, your toddler will think of the potty chair as a toy.    Sleep    Your toddler may go from two to one nap each day during the next 6 months.    Your toddler should sleep about 11 " to 16 hours each day.    Continue your regular nighttime routine which may include bathing, brushing teeth and reading.    Safety    Use an approved toddler car seat every time your child rides in the car.  Make sure to install it in the back seat.  Car seats should be rear facing until your child is 2 years of age.    Falls at this age are common.  Keep mcghee on all stairways and doors to dangerous areas.    Keep all medicines, cleaning supplies and poisons out of your toddler s reach.  Call the poison control center or your health care provider for directions in case your toddler swallows poison.    Put the poison control number on all phones:  1-544.834.2445.    Use safety catches on drawers and cupboards.  Cover electrical outlets with plastic covers.    Use sunscreen with a SPF of more than 15 when your toddler is outside.    Always keep the crib sides up to the highest position and the crib mattress at the lowest setting.    Teach your toddler to wash her hands and face often. This is important before eating and drinking.    Always put a helmet on your toddler if she rides in a bicycle carrier or behind you on a bike.    Never leave your child alone in the bathtub or near water.    Do not leave your child alone in the car, even if he or she is asleep.    What Your Toddler Needs    Read to your toddler often.    Hug, cuddle and kiss your toddler often.  Your toddler is gaining independence but still needs to know you love and support her.    Let your toddler make some choices. Ask her,  Would you like to wear, the green shirt or the red shirt?     Set a few clear rules and be consistent with them.    Teach your toddler about sharing.  Just know that she may not be ready for this.    Teach and praise positive behaviors.  Distract and prevent negative or dangerous behaviors.    Ignore temper tantrums.  Make sure the toddler is safe during the tantrum.  Or, you may hold your toddler gently, but firmly.    Never  physically or emotionally hurt your child.  If you are losing control, take a few deep breaths, put your child in a safe place and go into another room for a few minutes.  If possible, have someone else watch your child so you can take a break.  Call a friend, the Parent Warmline ([phone]) or call the Crisis Nursery (101-822-1638).    The American Academy of Pediatrics does not recommend television for children age 2 or younger.    Dental Care    Brush your child's teeth one to two times each day with a soft-bristled toothbrush.    Use a small amount (no more than pea size) of fluoridated toothpaste once daily.    Parents should do the brushing and then let the child play with the toothbrush.    Your pediatric provider will speak with your regarding the need for regular dental appointments for cleanings and check-ups starting when your child s first tooth appears. (Your child may need fluoride supplements if you have well water.)                  Follow-ups after your visit        Who to contact     If you have questions or need follow up information about today's clinic visit or your schedule please contact [facility] directly at [phone].  Normal or non-critical lab and imaging results will be communicated to you by [employer], letter or phone within 4 business days after the clinic has received the results. If you do not hear from us within 7 days, please contact the clinic through [employer] or phone. If you have a critical or abnormal lab result, we will notify you by phone as soon as possible.  Submit refill requests through [employer] or call your pharmacy and they will forward the refill request to us. Please allow 3 business days for your refill to be completed.          Additional Information About Your Visit        [employer] Information     [employer] lets you send messages to your doctor, view your test results, renew your prescriptions, schedule appointments and more. To sign up, go to  [email or web address], contact your [facility] clinic or call [phone] during business hours.            Care EveryWhere ID     This is your Care EveryWhere ID. This could be used by other organizations to access your [facility] medical records  [id number]        Your Vitals Were     Pulse Temperature Respirations Height Head Circumference Pulse Oximetry    172 98.2  F (36.8  C) 24 2' 5.5\" (0.749 m) 18.6\" (47.2 cm) 97%    BMI (Body Mass Index)                   14.95 kg/m2            Blood Pressure from Last 3 Encounters:   No data found for BP    Weight from Last 3 Encounters:   05/18/18 18 lb 8 oz (8.392 kg) (13 %)*   03/07/18 17 lb 4 oz (7.825 kg) (10 %)*   03/04/18 18 lb 7.5 oz (8.377 kg) (25 %)*     * Growth percentiles are based on WHO (Girls, 0-2 years) data.              We Performed the Following     DTAP IMMUNIZATION (<7Y), IM [45881]     HIB VACCINE, PRP-T, IM [[id number]]     PNEUMOCOCCAL CONJ VACCINE 13 VALENT IM [[id number]]        Primary Care Provider Office Phone # Fax #    Janet Barfield [facility] [phone] [phone]        Equal Access to Services     YAMEL KELLEY : Hadtirso Guillermo, waaxda luqadaha, qaybta kaalinderjit hernandez . So [facility] [phone].    ATENCIÓN: Si habla español, tiene a hsu disposición servicios gratuitos de asistencia lingüística. Allie al [phone].    We comply with applicable federal civil rights laws and [place] laws. We do not discriminate on the basis of race, color, national origin, age, disability, sex, sexual orientation, or gender identity.            Thank you!     Thank you for choosing [facility]  for your care. Our goal is always to provide you with excellent care. Hearing back from our patients is one way we can continue to improve our services. Please take a few minutes to complete the written survey that you may receive in the mail after " your visit with us. Thank you!             Your Updated Medication List - Protect others around you: Learn how to safely use, store and throw away your medicines at www.disposemymeds.org.      Notice  As of 5/18/2018  4:11 PM    You have not been prescribed any medications.       n/a

## 2022-04-08 ENCOUNTER — TRANSCRIPTION ENCOUNTER (OUTPATIENT)
Age: 67
End: 2022-04-08

## 2022-04-08 VITALS
DIASTOLIC BLOOD PRESSURE: 66 MMHG | OXYGEN SATURATION: 95 % | HEART RATE: 84 BPM | TEMPERATURE: 97 F | RESPIRATION RATE: 18 BRPM | SYSTOLIC BLOOD PRESSURE: 129 MMHG

## 2022-04-08 LAB — SURGICAL PATHOLOGY STUDY: SIGNIFICANT CHANGE UP

## 2022-04-08 PROCEDURE — 99024 POSTOP FOLLOW-UP VISIT: CPT

## 2022-04-08 RX ORDER — ACETAMINOPHEN 500 MG
2 TABLET ORAL
Qty: 0 | Refills: 0 | DISCHARGE
Start: 2022-04-08

## 2022-04-08 RX ADMIN — PANTOPRAZOLE SODIUM 40 MILLIGRAM(S): 20 TABLET, DELAYED RELEASE ORAL at 11:11

## 2022-04-08 RX ADMIN — ENOXAPARIN SODIUM 40 MILLIGRAM(S): 100 INJECTION SUBCUTANEOUS at 11:10

## 2022-04-08 NOTE — PROGRESS NOTE ADULT - SUBJECTIVE AND OBJECTIVE BOX
GENERAL SURGERY PROGRESS NOTE    Patient: STEW CORONA , 66y ([birth year])Female   MRN: [id number]  Location: [address]  Visit: 04-05-22 Inpatient  Date: 04-07-22 @ 14:13    Hospital Day #:3  Post-Op Day #:0    Procedure/Dx/Injuries: s/p robotic assisted cholecystectomy    Events of past 24 hours: Pt seen and examined at bedside. Pt went to OR today for robotic assisted cholecystectomy. Post-op check, patient is doing well, c/o mild abdominal pain. Ambulating and voiding. No n/v.     PAST MEDICAL & SURGICAL HISTORY:    Vitals:   T(F): 97.5 (04-07-22 @ 11:40), Max: 98.5 (04-06-22 @ 20:38)  HR: 86 (04-07-22 @ 11:40)  BP: 131/81 (04-07-22 @ 11:40)  RR: 18 (04-07-22 @ 11:40)  SpO2: 96% (04-07-22 @ 11:40)    Diet, Regular:   Low Fat (LOWFAT)    PHYSICAL EXAM:  General: NAD, AAOx3, calm and cooperative  HEENT: NCAT, EOM  Cardiac: S1, S2  Respiratory: normal respiratory effort, breath sounds equal BL  Abdomen: Soft, non-distended, mild RUQ tenderness, no rebound, no guarding, RUQ XENIA drain in place  Skin: Warm/dry, normal color, no jaundice    MEDICATIONS  (STANDING):  enoxaparin Injectable 40 milliGRAM(s) SubCutaneous every 24 hours  pantoprazole  Injectable 40 milliGRAM(s) IV Push daily    MEDICATIONS  (PRN):  acetaminophen     Tablet .. 650 milliGRAM(s) Oral every 6 hours PRN Temp greater or equal to 38C (100.4F), Mild Pain (1 - 3)  ibuprofen  Tablet. 400 milliGRAM(s) Oral every 6 hours PRN Temp greater or equal to 38C (100.4F), Mild Pain (1 - 3)  oxyCODONE    IR 5 milliGRAM(s) Oral every 6 hours PRN Moderate Pain (4 - 6)    DVT PROPHYLAXIS: enoxaparin Injectable 40 milliGRAM(s) SubCutaneous every 24 hours  GI PROPHYLAXIS: pantoprazole  Injectable 40 milliGRAM(s) IV Push daily    LAB/STUDIES:  Labs:               11.1   8.33  )-----------( 283      ( 06 Apr 2022 20:00 )             34.4       Auto Neutrophil %: 72.5 % (04-06-22 @ 20:00)  Auto Immature Granulocyte %: 0.4 % (04-06-22 @ 20:00)    04-06    135  |  99  |  9<L>  ----------------------------<  152<H>  3.7   |  24  |  0.6<L>    Calcium, Total Serum: 9.6 mg/dL (04-06-22 @ 20:00)    LFTs:             6.8  | 0.9  | 17       ------------------[78      ( 06 Apr 2022 20:00 )  4.6  | 0.2  | 18          Lipase:x      Amylase:x         Lactate, Blood: 1.6 mmol/L (04-05-22 @ 23:30)  Lactate, Blood: 0.9 mmol/L (04-05-22 @ 16:14)    CARDIAC MARKERS ( 05 Apr 2022 16:14 )  x     / <0.01 ng/mL / x     / x     / x        IMAGING:  No new imaging past 24hrs
GENERAL SURGERY PROGRESS NOTE    Patient: STEW CORONA , 66y ([birth year])Female   MRN: [id number]  Location: [address]  Visit: 04-05-22 Inpatient  Date: 04-06-22 @ 08:52    Hospital Day #:2    Procedure/Dx/Injuries: acute cholecystitis     Events of past 24 hours: Pt seen and examined at bedside. Pt admitted to surgical service and transferred to the floors. Started IV cipro and flagyl. Afebrile     PAST MEDICAL & SURGICAL HISTORY:    Vitals:   T(F): 98.7 (04-06-22 @ 05:00), Max: 98.7 (04-06-22 @ 05:00)  HR: 68 (04-06-22 @ 05:00)  BP: 117/64 (04-06-22 @ 05:00)  RR: 18 (04-06-22 @ 05:00)  SpO2: 98% (04-06-22 @ 05:00)    Diet, NPO after Midnight:      NPO Start Date: 06-Apr-2022,   NPO Start Time: 23:59  Except Medications  Diet, Full Liquid    Fluids: lactated ringers.: Solution, 1000 milliLiter(s) infuse at 120 mL/Hr, Stop After 1 Days  Provider's Contact #: [phone]    PHYSICAL EXAM:  General: NAD  HEENT: NCAT, EOMI  Cardiac: S1, S2  Respiratory: normal respiratory effort, breath sounds equal BL  Abdomen: Soft, non-distended, mild RUQ tenderness, no rebound, no guarding  Skin: Warm/dry, normal color, no jaundice    MEDICATIONS  (STANDING):  ciprofloxacin   IVPB      ciprofloxacin   IVPB 400 milliGRAM(s) IV Intermittent every 12 hours  enoxaparin Injectable 40 milliGRAM(s) SubCutaneous every 24 hours  lactated ringers. 1000 milliLiter(s) (120 mL/Hr) IV Continuous <Continuous>  metroNIDAZOLE  IVPB      metroNIDAZOLE  IVPB 500 milliGRAM(s) IV Intermittent every 8 hours  sodium phosphate IVPB 15 milliMole(s) IV Intermittent once    MEDICATIONS  (PRN):  acetaminophen     Tablet .. 650 milliGRAM(s) Oral every 6 hours PRN Temp greater or equal to 38C (100.4F), Mild Pain (1 - 3)  ibuprofen  Tablet. 400 milliGRAM(s) Oral every 6 hours PRN Temp greater or equal to 38C (100.4F), Mild Pain (1 - 3)  oxyCODONE    IR 5 milliGRAM(s) Oral every 6 hours PRN Moderate Pain (4 - 6)    DVT PROPHYLAXIS: enoxaparin Injectable 40 milliGRAM(s) SubCutaneous every 24 hours  ANTIBIOTICS:  ciprofloxacin   IVPB    ciprofloxacin   IVPB 400 milliGRAM(s)  metroNIDAZOLE  IVPB    metroNIDAZOLE  IVPB 500 milliGRAM(s)    LAB/STUDIES:  Labs:                        10.8   7.53  )-----------( 282      ( 05 Apr 2022 23:30 )             33.3       Auto Neutrophil %: 61.6 % (04-05-22 @ 23:30)  Auto Immature Granulocyte %: 0.3 % (04-05-22 @ 23:30)  Auto Neutrophil %: 59.4 % (04-05-22 @ 16:14)  Auto Immature Granulocyte %: 0.4 % (04-05-22 @ 16:14)    04-05    137  |  101  |  11  ----------------------------<  125<H>  4.1   |  24  |  0.5<L>    Calcium, Total Serum: 10.0 mg/dL (04-05-22 @ 23:30)    LFTs:             6.4  | 1.0  | 14       ------------------[68      ( 05 Apr 2022 23:30 )  4.3  | 0.2  | 15          Lipase:30     Amylase:x         Lactate, Blood: 1.6 mmol/L (04-05-22 @ 23:30)  Lactate, Blood: 0.9 mmol/L (04-05-22 @ 16:14)    Coags:    CARDIAC MARKERS ( 05 Apr 2022 16:14 )  x     / <0.01 ng/mL / x     / x     / x        IMAGING:  < from: US Abdomen Upper Quadrant Right (04.05.22 @ 16:37) >  IMPRESSION:  Cholelithiasis. Possible cholecystitis.  Hepatic steatosis.  --- End of Report ---    < from: CT Abdomen and Pelvis w/ IV Cont (04.05.22 @ 16:26) >  IMPRESSION:  Gallbladder distention, wall edema and cholelithiasis. Findings may   represent cholecystitis. Correlation with concurrently performed sonogram   recommended.  --- End of Report ---  
GENERAL SURGERY PROGRESS NOTE    Patient: STEW CORONA , 66y ([birth year])Female   MRN: [id number]  Location: [address]  Visit: 04-05-22 Inpatient  Date: 04-08-22 @ 01:07    Hospital Day #:4  Post-Op Day #:1    Procedure/Dx/Injuries: s/p robotic cholecystectomy; B/L TAP block    Events of past 24 hours: Pt seen and examined at bedside. Pt is doing well postop. Passing flatus, no BM. Ambulating, voiding, and tolerating diet without difficulty. No acute overnight events. Afebrile     PAST MEDICAL & SURGICAL HISTORY:    Vitals:   T(F): 98.3 (04-07-22 @ 21:06), Max: 98.5 (04-07-22 @ 06:51)  HR: 87 (04-07-22 @ 21:06)  BP: 134/74 (04-07-22 @ 21:06)  RR: 18 (04-07-22 @ 21:06)  SpO2: 98% (04-07-22 @ 21:06)    Diet, Regular:   Low Fat (LOWFAT)    Fluids:   I & O's:    PHYSICAL EXAM:  General: NAD  HEENT: NCAT, EOMI  Cardiac: S1, S2  Respiratory: normal respiratory effort, breath sounds equal BL  Abdomen: Soft, non-distended, mild RUQ tenderness, no rebound, no guarding. XENIA drain in place with serosanguinous output   Skin: Warm/dry, normal color, no jaundice  Incision/wound: healing well    MEDICATIONS  (STANDING):  enoxaparin Injectable 40 milliGRAM(s) SubCutaneous every 24 hours  melatonin 3 milliGRAM(s) Oral at bedtime  pantoprazole  Injectable 40 milliGRAM(s) IV Push daily    MEDICATIONS  (PRN):  acetaminophen     Tablet .. 650 milliGRAM(s) Oral every 6 hours PRN Temp greater or equal to 38C (100.4F), Mild Pain (1 - 3)  ibuprofen  Tablet. 400 milliGRAM(s) Oral every 6 hours PRN Temp greater or equal to 38C (100.4F), Mild Pain (1 - 3)  oxyCODONE    IR 5 milliGRAM(s) Oral every 6 hours PRN Moderate Pain (4 - 6)    DVT PROPHYLAXIS: enoxaparin Injectable 40 milliGRAM(s) SubCutaneous every 24 hours  GI PROPHYLAXIS: pantoprazole  Injectable 40 milliGRAM(s) IV Push daily    LAB/STUDIES:  Labs:               11.1   8.33  )-----------( 283      ( 06 Apr 2022 20:00 )             34.4       04-06    135  |  99  |  9<L>  ----------------------------<  152<H>  3.7   |  24  |  0.6<L>    LFTs:             6.8  | 0.9  | 17       ------------------[78      ( 06 Apr 2022 20:00 )  4.6  | 0.2  | 18          Lipase:x      Amylase:x         Lactate, Blood: 1.6 mmol/L (04-05-22 @ 23:30)  Lactate, Blood: 0.9 mmol/L (04-05-22 @ 16:14)    IMAGING:  No new imaging past 24hrs

## 2022-04-08 NOTE — DISCHARGE NOTE PROVIDER - NSDCFUADDINST_GEN_ALL_CORE_FT
Follow up with Dr Valentin in 1 week call office for appointment  follow up with  your PMD     no strenuous activity  keep wound clean and dry  no tub bath  resume home medications    if experience fever, shortness of breath, chest pain, dizziness, vomiting , bleeding or drainage from wound call MD or return to ED  Follow up with Dr Valentin in 1 week call office for appointment  follow up with  your PMD     no strenuous activity  continue drain care  keep wound clean and dry  no tub bath  resume home medications    if experience fever, shortness of breath, chest pain, dizziness, vomiting , bleeding or drainage from wound call MD or return to ED

## 2022-04-08 NOTE — DISCHARGE NOTE NURSING/CASE MANAGEMENT/SOCIAL WORK - PATIENT PORTAL LINK FT
You can access the FollowMyHealth Patient Portal offered by [facility] by registering at the following website: [email or web address]. By joining [employer]’s FollowMyHealth portal, you will also be able to view your health information using other applications (apps) compatible with our system.

## 2022-04-08 NOTE — DISCHARGE NOTE PROVIDER - CARE PROVIDER_API CALL
Kyleigh Valentin)  [facility]  [address] [place] [address], [address] [place] [address]  Phone: [phone]  Fax: [phone]  Follow Up Time:

## 2022-04-08 NOTE — DISCHARGE NOTE NURSING/CASE MANAGEMENT/SOCIAL WORK - NSDCPEFALRISK_GEN_ALL_CORE
For information on Fall & Injury Prevention, visit: [email or web address] OR  [email or web address] OR  https://www.cdc.gov/steadi/patient.html

## 2022-04-08 NOTE — DISCHARGE NOTE PROVIDER - HOSPITAL COURSE
This is a 66F PMH of HLD, TIA, kidney stones, PSH of L3-5 spinal fusion 1.5 years ago presenting with 2.5 days of epigastric abdominal pain with associated nausea. CT A/P demonstrates gallbladder distention, wall edema and cholelithiasis suggestive of cholecystitis. pt admitted to surgery service , cleared by cardiology and medicine and   underwent laparoscopic cholecystectomy 4/7/22. Post operatively pt tolerated po diet, voided and ambulated. vital signs stable and afebrile. pt stable and discharged home   on 4/8/22. pt advised to follow up with Dr Valentin in 1 week . resume home medications. precaution provided.

## 2022-04-08 NOTE — PHYSICAL THERAPY INITIAL EVALUATION ADULT - PERTINENT HX OF CURRENT PROBLEM, REHAB EVAL
6F PMH of HLD, TIA, kidney stones, PSH of L3-5 spinal fusion 1.5 years ago presenting with 2.5 days of epigastric abdominal pain with associated nausea, no emesis. This is the first episode of this abdominal pain. Patient presented to the ED on 4/3 for similar epigastric abdominal pain,

## 2022-04-08 NOTE — PHYSICAL THERAPY INITIAL EVALUATION ADULT - GENERAL OBSERVATIONS, REHAB EVAL
PT IE [phone]. Chart reviewed. pt encountered semi-reclined in bed. In NAD. + IV lock. + XENIA drain, + B sequentials in place.

## 2022-04-08 NOTE — PROGRESS NOTE ADULT - ASSESSMENT
ASSESSMENT:  66F PMH of HLD, TIA, kidney stones, PSH of L3-5 spinal fusion 1.5 years ago presenting with 2.5 days of epigastric abdominal pain with associated nausea, no emesis on exam palpable gallbladder correlated to imaging with distended gallbladder concerning for early acute cholecystitis. s/p robotic assisted cholecystectomy, B/L TAP block    Plan  -Low fat diet  -Monitor XENIA drain output  -Pain control  -Ambulate as tolerated  -Encourage incentive spirometry   -DVT/GI ppx  -Discharge to home today     BLUE TEAM SPECTRA: [id number]

## 2022-04-13 ENCOUNTER — APPOINTMENT (OUTPATIENT)
Dept: SURGERY | Facility: CLINIC | Age: 67
End: 2022-04-13
Payer: MEDICARE

## 2022-04-13 VITALS
TEMPERATURE: 96.3 F | HEIGHT: 62 IN | WEIGHT: 147 LBS | HEART RATE: 84 BPM | SYSTOLIC BLOOD PRESSURE: 110 MMHG | OXYGEN SATURATION: 97 % | DIASTOLIC BLOOD PRESSURE: 80 MMHG | BODY MASS INDEX: 27.05 KG/M2

## 2022-04-13 PROCEDURE — 99024 POSTOP FOLLOW-UP VISIT: CPT

## 2022-04-16 DIAGNOSIS — E78.5 HYPERLIPIDEMIA, UNSPECIFIED: ICD-10-CM

## 2022-04-16 DIAGNOSIS — R10.9 UNSPECIFIED ABDOMINAL PAIN: ICD-10-CM

## 2022-04-16 DIAGNOSIS — Z98.1 ARTHRODESIS STATUS: ICD-10-CM

## 2022-04-16 DIAGNOSIS — K76.0 FATTY (CHANGE OF) LIVER, NOT ELSEWHERE CLASSIFIED: ICD-10-CM

## 2022-04-16 DIAGNOSIS — Z86.73 PERSONAL HISTORY OF TRANSIENT ISCHEMIC ATTACK (TIA), AND CEREBRAL INFARCTION WITHOUT RESIDUAL DEFICITS: ICD-10-CM

## 2022-04-16 DIAGNOSIS — K80.00 CALCULUS OF GALLBLADDER WITH ACUTE CHOLECYSTITIS WITHOUT OBSTRUCTION: ICD-10-CM

## 2022-04-16 DIAGNOSIS — Z88.0 ALLERGY STATUS TO PENICILLIN: ICD-10-CM

## 2022-04-16 DIAGNOSIS — Z87.442 PERSONAL HISTORY OF URINARY CALCULI: ICD-10-CM

## 2022-05-04 ENCOUNTER — APPOINTMENT (OUTPATIENT)
Dept: SURGERY | Facility: CLINIC | Age: 67
End: 2022-05-04
Payer: MEDICARE

## 2022-05-04 VITALS
DIASTOLIC BLOOD PRESSURE: 74 MMHG | HEIGHT: 62 IN | TEMPERATURE: 97.3 F | SYSTOLIC BLOOD PRESSURE: 120 MMHG | HEART RATE: 95 BPM | WEIGHT: 149 LBS | OXYGEN SATURATION: 98 % | BODY MASS INDEX: 27.42 KG/M2

## 2022-05-04 DIAGNOSIS — Z87.19 PERSONAL HISTORY OF OTHER DISEASES OF THE DIGESTIVE SYSTEM: ICD-10-CM

## 2022-05-04 PROCEDURE — 99024 POSTOP FOLLOW-UP VISIT: CPT

## 2022-05-04 NOTE — HISTORY OF PRESENT ILLNESS
[de-identified] : 67yo female with PMHx of HLD, TIA, kidney stones, PSH of L3-5 spinal fusion recently admitted for acute cholecystitis s/p laparoscopic cholecystectomy 4/7/2022. She was seen at her initial postop visit 4/13 and doing well. At this time, patient reports intermittent episodes of bilateral upper abdominal pain along her costal margins. Denies fever/chill, nausea/vomiting, chest pain or shortness of breath. Tolerating light diet. +bowel function. Ambulating well.

## 2022-05-04 NOTE — ASSESSMENT
[FreeTextEntry1] : 67yo female s/p laparoscopic cholecystectomy 4/7/2022. Doing well. \par -no heavy lifting x 6 weeks\par -low fat diet for 1 month\par -pathology given for records\par -return to office PRN - particularly fevers, worsening abdominal pain, drainage from wound

## 2022-05-04 NOTE — PHYSICAL EXAM
[Normal Breath Sounds] : Normal breath sounds [Normal Heart Sounds] : normal heart sounds [Alert] : alert [Calm] : calm [de-identified] : no acute distress [de-identified] : soft, non-tender, no rebound/guarding, no masses/hernias, wounds without erythema, drainage, or induration [de-identified] : full ROM x 4

## 2022-05-04 NOTE — ASSESSMENT
[FreeTextEntry1] : 67yo female s/p laparoscopic cholecystectomy 4/7/2022. Returns with abdominal pain. \par -low fat diet for 1 month\par -will observe pain\par -no heavy lifting for 6 weeks\par -pathology given for records\par -return to office 1 month - particularly fevers, worsening abdominal pain, drainage from wounds; consider CT A/P or EGD if symptoms persist

## 2022-05-04 NOTE — HISTORY OF PRESENT ILLNESS
[de-identified] : 67yo female with PMHx of HLD, TIA, kidney stones, PSH of L3-5 spinal fusion recently admitted for acute cholecystitis s/p laparoscopic cholecystectomy 4/7/2022. Doing well. Denies nausea/vomiting, fever/chills, chest pain or shortness of breath. Tolerating light diet. Ambulating well. Breathing well. +bowel function. No dysuria.

## 2022-05-04 NOTE — PHYSICAL EXAM
[Normal Breath Sounds] : Normal breath sounds [Normal Heart Sounds] : normal heart sounds [Alert] : alert [Calm] : calm [de-identified] : no acute distress [de-identified] : soft, non-tender, no rebound/guarding, no masses/hernias, wounds without erythema, drainage, or induration [de-identified] : full ROM x 4

## 2022-06-01 ENCOUNTER — APPOINTMENT (OUTPATIENT)
Dept: SURGERY | Facility: CLINIC | Age: 67
End: 2022-06-01

## 2022-09-14 ENCOUNTER — APPOINTMENT (OUTPATIENT)
Dept: OTOLARYNGOLOGY | Facility: CLINIC | Age: 67
End: 2022-09-14

## 2024-04-30 ENCOUNTER — APPOINTMENT (OUTPATIENT)
Dept: NEUROLOGY | Facility: CLINIC | Age: 69
End: 2024-04-30

## 2024-05-07 ENCOUNTER — OUTPATIENT (OUTPATIENT)
Dept: OUTPATIENT SERVICES | Facility: HOSPITAL | Age: 69
LOS: 1 days | Discharge: HOME | End: 2024-05-07
Payer: MEDICARE

## 2024-05-07 VITALS — RESPIRATION RATE: 17 BRPM | HEART RATE: 66 BPM | SYSTOLIC BLOOD PRESSURE: 138 MMHG | DIASTOLIC BLOOD PRESSURE: 73 MMHG

## 2024-05-07 VITALS
TEMPERATURE: 97 F | DIASTOLIC BLOOD PRESSURE: 76 MMHG | WEIGHT: 149.03 LBS | HEART RATE: 82 BPM | RESPIRATION RATE: 16 BRPM | OXYGEN SATURATION: 98 % | HEIGHT: 62 IN | SYSTOLIC BLOOD PRESSURE: 141 MMHG

## 2024-05-07 DIAGNOSIS — Z98.1 ARTHRODESIS STATUS: Chronic | ICD-10-CM

## 2024-05-07 DIAGNOSIS — Z96.651 PRESENCE OF RIGHT ARTIFICIAL KNEE JOINT: Chronic | ICD-10-CM

## 2024-05-07 DIAGNOSIS — H25.11 AGE-RELATED NUCLEAR CATARACT, RIGHT EYE: ICD-10-CM

## 2024-05-07 PROCEDURE — V2632: CPT

## 2024-05-07 PROCEDURE — V2788: CPT

## 2024-05-07 NOTE — ASU DISCHARGE PLAN (ADULT/PEDIATRIC) - NS MD DC FALL RISK RISK
For information on Fall & Injury Prevention, visit: [email or web address] OR  [email or web address] OR  https://www.cdc.gov/steadi/patient.html

## 2024-05-09 DIAGNOSIS — Z88.0 ALLERGY STATUS TO PENICILLIN: ICD-10-CM

## 2024-05-09 DIAGNOSIS — H25.11 AGE-RELATED NUCLEAR CATARACT, RIGHT EYE: ICD-10-CM

## 2024-05-14 ENCOUNTER — OUTPATIENT (OUTPATIENT)
Dept: OUTPATIENT SERVICES | Facility: HOSPITAL | Age: 69
LOS: 1 days | Discharge: HOME | End: 2024-05-14
Payer: MEDICARE

## 2024-05-14 VITALS
WEIGHT: 149.03 LBS | RESPIRATION RATE: 18 BRPM | OXYGEN SATURATION: 99 % | TEMPERATURE: 98 F | HEIGHT: 62 IN | SYSTOLIC BLOOD PRESSURE: 124 MMHG | DIASTOLIC BLOOD PRESSURE: 80 MMHG | HEART RATE: 72 BPM

## 2024-05-14 VITALS — HEART RATE: 73 BPM | DIASTOLIC BLOOD PRESSURE: 83 MMHG | RESPIRATION RATE: 17 BRPM | SYSTOLIC BLOOD PRESSURE: 129 MMHG

## 2024-05-14 DIAGNOSIS — H25.12 AGE-RELATED NUCLEAR CATARACT, LEFT EYE: ICD-10-CM

## 2024-05-14 DIAGNOSIS — Z98.890 OTHER SPECIFIED POSTPROCEDURAL STATES: Chronic | ICD-10-CM

## 2024-05-14 DIAGNOSIS — Z98.1 ARTHRODESIS STATUS: Chronic | ICD-10-CM

## 2024-05-14 DIAGNOSIS — Z96.651 PRESENCE OF RIGHT ARTIFICIAL KNEE JOINT: Chronic | ICD-10-CM

## 2024-05-14 PROCEDURE — V2632: CPT

## 2024-05-14 PROCEDURE — V2788: CPT

## 2024-05-14 RX ORDER — CETIRIZINE HYDROCHLORIDE 10 MG/1
1 TABLET ORAL
Refills: 0 | DISCHARGE

## 2024-05-14 NOTE — ASU PREOP CHECKLIST - INTERNAL PROSTHESES
right iol, right knee hardwared, dental, lumbar lillie and hardware/yes(specify) right iol, right knee hardware, dental, lumbar lillie and hardware/yes(specify)

## 2024-05-14 NOTE — PRE-ANESTHESIA EVALUATION ADULT - BMI (KG/M2)
I-STAT Chem-8+ Results:   Value Reference Range   Sodium 141 136-145 mmol/L   Potassium  4.0             3.5-5.1 mmol/L   Chloride 103  mmol/L   Ionized Calcium 1.05 1.06-1.42 mmol/L   CO2 (measured) 29 23-29 mmol/L   Glucose 84  mg/dL   BUN 16 6-30 mg/dL   Creatinine 0.9 0.5-1.4 mg/dL   Hematocrit 40 36-54%         27.3

## 2024-05-14 NOTE — PRE-ANESTHESIA EVALUATION ADULT - NSATTENDATTESTRD_GEN_ALL_CORE
The patient has been re-examined and I agree with the above assessment or I updated with my findings.
Parent

## 2024-05-14 NOTE — ASU PATIENT PROFILE, ADULT - NSICDXPASTSURGICALHX_GEN_ALL_CORE_FT
PAST SURGICAL HISTORY:  H/O spinal fusion 7/2020 lumbar with hardware    H/O total knee replacement, right     History of surgery right iol,k choleycystectomy, eswal

## 2024-05-14 NOTE — ASU PATIENT PROFILE, ADULT - FALL HARM RISK - HARM RISK INTERVENTIONS

## 2024-05-14 NOTE — ASU PATIENT PROFILE, ADULT - NS PRO AD PATIENT TYPE
Verbal shift change report given to [address] [place] [address],[address] (oncoming nurse) by Franklin Hazard RN (offgoing nurse). Report included the following information SBAR, Kardex, Intake/Output, MAR and Recent Results. Zone Phone for oncoming shift:   [phone]    Shift Summary: VS stable. Straight cathed x 1 for urinary retention. LDAs               Peripheral IV 02/05/17 Right Forearm (Active)   Site Assessment Clean, dry, & intact 2/5/2017  7:59 PM   Phlebitis Assessment 0 2/5/2017  7:59 PM   Infiltration Assessment 0 2/5/2017  7:59 PM   Dressing Status Clean, dry, & intact 2/5/2017  7:59 PM   Dressing Type Transparent 2/5/2017  7:59 PM   Hub Color/Line Status Pink; Infusing 2/5/2017  7:59 PM   Alcohol Cap Used Yes 2/5/2017  7:59 PM       Peripheral IV 02/05/17 Left Antecubital (Active)   Site Assessment Clean, dry, & intact 2/5/2017  7:59 PM   Phlebitis Assessment 0 2/5/2017  7:59 PM   Infiltration Assessment 0 2/5/2017  7:59 PM   Dressing Status Clean, dry, & intact 2/5/2017  7:59 PM   Dressing Type Transparent 2/5/2017  7:59 PM   Hub Color/Line Status Green;Capped 2/5/2017  7:59 PM   Alcohol Cap Used Yes 2/5/2017  7:59 PM                        Intake & Output   Date 02/04/17 1900 - 02/05/17 0659(Not Admitted) 02/05/17 0700 - 02/06/17 0659   Shift 5670-0142 24 Hour Total 4707-9614 5549-4892 24 Hour Total   I  N  T  A  K  E   Shift Total  (mL/kg)        O  U  T  P  U  T   Urine   350  (0.4) 1150 1500      Urine    600 600      Urine Voided   350 550 900    Shift Total  (mL/kg)   350  (4.7) 1150  (15.4) 1500  (20)   NET   -350 -1150 -1500   Weight (kg)   74.8 74.8 74.8      Last Bowel Movement Last Bowel Movement Date:  (unknown)   Glucose Checks [x] N/A  [] AC/HS  [] Q6  Concerns:   Nutrition Active Orders   Diet    DIET CARDIAC Regular       Consults [x]PT  [x]OT  []Speech  []Case Management   Cardiac Monitoring []N/A [x]Yes Expires: Living Will

## 2024-05-17 DIAGNOSIS — H52.202 UNSPECIFIED ASTIGMATISM, LEFT EYE: ICD-10-CM

## 2024-05-17 DIAGNOSIS — Z88.0 ALLERGY STATUS TO PENICILLIN: ICD-10-CM

## 2024-05-17 DIAGNOSIS — H26.8 OTHER SPECIFIED CATARACT: ICD-10-CM

## 2024-05-17 DIAGNOSIS — Z98.1 ARTHRODESIS STATUS: ICD-10-CM

## 2024-05-17 DIAGNOSIS — Z96.651 PRESENCE OF RIGHT ARTIFICIAL KNEE JOINT: ICD-10-CM

## 2025-03-05 ENCOUNTER — APPOINTMENT (OUTPATIENT)
Facility: CLINIC | Age: 70
End: 2025-03-05

## 2025-05-16 NOTE — ED PROVIDER NOTE - IV ALTEPASE ADMIN HIDDEN
show EP: Patient was endorsed to me by Dr. Felix to follow-up OB/GYN consult.  Patient was evaluated by OB/GYN service and admitted for further management.

## 2025-05-21 ENCOUNTER — NON-APPOINTMENT (OUTPATIENT)
Age: 70
End: 2025-05-21

## 2025-05-21 ENCOUNTER — APPOINTMENT (OUTPATIENT)
Facility: CLINIC | Age: 70
End: 2025-05-21
Payer: MEDICARE

## 2025-05-21 PROCEDURE — 99204 OFFICE O/P NEW MOD 45 MIN: CPT

## 2025-05-21 PROCEDURE — 92202 OPSCPY EXTND ON/MAC DRAW: CPT

## 2025-05-21 PROCEDURE — 92134 CPTRZ OPH DX IMG PST SGM RTA: CPT

## 2025-05-21 PROCEDURE — 76512 OPH US DX B-SCAN: CPT | Mod: RT

## 2025-07-18 NOTE — PRE-ANESTHESIA EVALUATION ADULT - RESPIRATORY RATE (BREATHS/MIN)
Tips for building healthy habits:  - Aim to drink 64 oz of water or more per day  - Avoid sugar sweetened beverages  - Aim for 25-38 g of dietary fiber per day  - Aim for 4-5 servings of fruits and vegetables per day  - Aim to exercise at least 150 mins per week (30 mins 5x per week). You can use apps such as the [employer], [employer], or Youtube Videos to help find exercise plans   - Have a goal of 8,000 to 10,000 steps per day  - Aim to lift weights or do resistance training 2x per week  - Limit eating out to 1x per week or less  - Aim for 7-9 hours of sleep per night    18